# Patient Record
Sex: MALE | Race: WHITE | Employment: FULL TIME | URBAN - METROPOLITAN AREA
[De-identification: names, ages, dates, MRNs, and addresses within clinical notes are randomized per-mention and may not be internally consistent; named-entity substitution may affect disease eponyms.]

---

## 2019-12-11 ENCOUNTER — OFFICE VISIT (OUTPATIENT)
Dept: FAMILY MEDICINE | Facility: CLINIC | Age: 64
End: 2019-12-11
Payer: COMMERCIAL

## 2019-12-11 VITALS
HEIGHT: 65 IN | RESPIRATION RATE: 18 BRPM | BODY MASS INDEX: 35.49 KG/M2 | DIASTOLIC BLOOD PRESSURE: 86 MMHG | TEMPERATURE: 98.1 F | WEIGHT: 213 LBS | OXYGEN SATURATION: 97 % | HEART RATE: 88 BPM | SYSTOLIC BLOOD PRESSURE: 158 MMHG

## 2019-12-11 DIAGNOSIS — M79.641 PAIN OF RIGHT HAND: ICD-10-CM

## 2019-12-11 DIAGNOSIS — G47.19 EXCESSIVE DAYTIME SLEEPINESS: ICD-10-CM

## 2019-12-11 DIAGNOSIS — Z12.12 SCREENING FOR COLORECTAL CANCER: ICD-10-CM

## 2019-12-11 DIAGNOSIS — Z12.11 SCREENING FOR COLORECTAL CANCER: ICD-10-CM

## 2019-12-11 DIAGNOSIS — F17.200 TOBACCO DEPENDENCE SYNDROME: ICD-10-CM

## 2019-12-11 DIAGNOSIS — H90.3 SENSORINEURAL HEARING LOSS (SNHL) OF BOTH EARS: ICD-10-CM

## 2019-12-11 DIAGNOSIS — R03.0 ELEVATED BLOOD PRESSURE READING WITHOUT DIAGNOSIS OF HYPERTENSION: ICD-10-CM

## 2019-12-11 DIAGNOSIS — R06.2 WHEEZING: ICD-10-CM

## 2019-12-11 DIAGNOSIS — Z00.00 ROUTINE GENERAL MEDICAL EXAMINATION AT A HEALTH CARE FACILITY: Primary | ICD-10-CM

## 2019-12-11 DIAGNOSIS — R25.2 CALF CRAMP: ICD-10-CM

## 2019-12-11 LAB
BASOPHILS # BLD AUTO: 0.1 10E9/L (ref 0–0.2)
BASOPHILS NFR BLD AUTO: 0.6 %
DIFFERENTIAL METHOD BLD: NORMAL
EOSINOPHIL # BLD AUTO: 0.4 10E9/L (ref 0–0.7)
EOSINOPHIL NFR BLD AUTO: 3.7 %
ERYTHROCYTE [DISTWIDTH] IN BLOOD BY AUTOMATED COUNT: 14.1 % (ref 10–15)
HBA1C MFR BLD: 6.1 % (ref 0–5.6)
HCT VFR BLD AUTO: 48.3 % (ref 40–53)
HCV AB SERPL QL IA: NONREACTIVE
HGB BLD-MCNC: 15.7 G/DL (ref 13.3–17.7)
HIV 1+2 AB+HIV1 P24 AG SERPL QL IA: NONREACTIVE
LYMPHOCYTES # BLD AUTO: 2.4 10E9/L (ref 0.8–5.3)
LYMPHOCYTES NFR BLD AUTO: 22.8 %
MCH RBC QN AUTO: 29.8 PG (ref 26.5–33)
MCHC RBC AUTO-ENTMCNC: 32.5 G/DL (ref 31.5–36.5)
MCV RBC AUTO: 92 FL (ref 78–100)
MONOCYTES # BLD AUTO: 0.9 10E9/L (ref 0–1.3)
MONOCYTES NFR BLD AUTO: 8.7 %
NEUTROPHILS # BLD AUTO: 6.6 10E9/L (ref 1.6–8.3)
NEUTROPHILS NFR BLD AUTO: 64.2 %
PLATELET # BLD AUTO: 223 10E9/L (ref 150–450)
RBC # BLD AUTO: 5.27 10E12/L (ref 4.4–5.9)
WBC # BLD AUTO: 10.3 10E9/L (ref 4–11)

## 2019-12-11 PROCEDURE — 90715 TDAP VACCINE 7 YRS/> IM: CPT | Performed by: PHYSICIAN ASSISTANT

## 2019-12-11 PROCEDURE — 99213 OFFICE O/P EST LOW 20 MIN: CPT | Mod: 25 | Performed by: PHYSICIAN ASSISTANT

## 2019-12-11 PROCEDURE — 83036 HEMOGLOBIN GLYCOSYLATED A1C: CPT | Performed by: PHYSICIAN ASSISTANT

## 2019-12-11 PROCEDURE — 86803 HEPATITIS C AB TEST: CPT | Performed by: PHYSICIAN ASSISTANT

## 2019-12-11 PROCEDURE — G0103 PSA SCREENING: HCPCS | Performed by: PHYSICIAN ASSISTANT

## 2019-12-11 PROCEDURE — 85025 COMPLETE CBC W/AUTO DIFF WBC: CPT | Performed by: PHYSICIAN ASSISTANT

## 2019-12-11 PROCEDURE — 87389 HIV-1 AG W/HIV-1&-2 AB AG IA: CPT | Performed by: PHYSICIAN ASSISTANT

## 2019-12-11 PROCEDURE — 80053 COMPREHEN METABOLIC PANEL: CPT | Performed by: PHYSICIAN ASSISTANT

## 2019-12-11 PROCEDURE — 90471 IMMUNIZATION ADMIN: CPT | Performed by: PHYSICIAN ASSISTANT

## 2019-12-11 PROCEDURE — 80061 LIPID PANEL: CPT | Performed by: PHYSICIAN ASSISTANT

## 2019-12-11 PROCEDURE — 99386 PREV VISIT NEW AGE 40-64: CPT | Mod: 25 | Performed by: PHYSICIAN ASSISTANT

## 2019-12-11 PROCEDURE — 36415 COLL VENOUS BLD VENIPUNCTURE: CPT | Performed by: PHYSICIAN ASSISTANT

## 2019-12-11 RX ORDER — ALBUTEROL SULFATE 90 UG/1
2 AEROSOL, METERED RESPIRATORY (INHALATION) EVERY 6 HOURS
Qty: 1 INHALER | Refills: 3 | Status: SHIPPED | OUTPATIENT
Start: 2019-12-11 | End: 2020-07-15

## 2019-12-11 ASSESSMENT — ENCOUNTER SYMPTOMS
DIARRHEA: 0
NERVOUS/ANXIOUS: 0
DIZZINESS: 0
ABDOMINAL PAIN: 0
CHILLS: 0
HEMATURIA: 0
EYE PAIN: 0
COUGH: 0
CONSTIPATION: 0
FEVER: 0
FREQUENCY: 0
HEMATOCHEZIA: 0

## 2019-12-11 ASSESSMENT — MIFFLIN-ST. JEOR: SCORE: 1702

## 2019-12-11 NOTE — PROGRESS NOTES
SUBJECTIVE:   CC: Nikunj Keen is an 63 year old male who presents for preventative health visit.     Healthy Habits:     Getting at least 3 servings of Calcium per day:  NO    Bi-annual eye exam:  Yes    Dental care twice a year:  Yes    Sleep apnea or symptoms of sleep apnea:  Daytime drowsiness and Excessive snoring    Diet:  Regular (no restrictions)    Frequency of exercise:  None    Taking medications regularly:  Not Applicable    Medication side effects:  Not applicable    PHQ-2 Total Score: 0    Additional concerns today:  No    63 year old male presenting for routine health physical. He reports he has not had a primary care visit for approximately 20 years. He feels like he is starting to age and would like routine health assessment. He works as a . He has 3 children, youngest age 27. He has 2 grandchildren and one great grandchild. He has been  twice in the past and currently has been in monogamous relationship with girlfriend for the past 10 years.     His biggest health concern today is sleep. He has been snoring, feeling excessively tired during the day, and occasionally will wake up gasping for air.     His blood pressure is elevated today. No personal history of hypertension. He recently adopted a dog which has been helping with exercise. Previously he was sedentary. Has been getting approximately 8,000 steps per day since adopting dog.     He is a current 1 PPD smoker. 46 year pack history. Not interested in quitting at present but he has been cutting back. He would like to focus on weight loss before focusing energy on smoking cessation.     He has a remote history of mild asthma. Has used albuterol inhaler PRN in the past. Notes he occasionally has a wheeze and would like refill of this medication today. No shortness of breath or chest pain. No fevers, chills, cough, night sweats, or unexplained weight loss.     [facility]  [facility]inn[facility]. He would like to be seen by ENT for further evaluation of this concern. He worked for a number of years in the  listening to intelligence feeds and thinks this is to blame for hearing loss. He wears hearing aids which help.     He also notes occasionally his right calf will cramp and he notices some pain near the thumb of his right hand along the first MTP joint. No history of trauma. No history of DVT. No recent long distance travel.     Today's PHQ-2 Score:   PHQ-2 ( 1999 Pfizer) 12/11/2019   Q1: Little interest or pleasure in doing things 0   Q2: Feeling down, depressed or hopeless 0   PHQ-2 Score 0   Q1: Little interest or pleasure in doing things Not at all   Q2: Feeling down, depressed or hopeless Not at all   PHQ-2 Score 0     Abuse: Current or Past(Physical, Sexual or Emotional)- No  Do you feel safe in your environment? Yes    Have you ever done Advance Care Planning? (For example, a Health Directive, POLST, or a discussion with a medical provider or your loved ones about your wishes): No, advance care planning information given to patient to review.  Patient declined advance care planning discussion at this time.    Social History     Tobacco Use     Smoking status: Current Every Day Smoker     Smokeless tobacco: Never Used   Substance Use Topics     Alcohol use: Not Currently     Alcohol Use 12/11/2019   Prescreen: >3 drinks/day or >7 drinks/week? No     Last PSA: No results found for: PSA    Reviewed orders with patient. Reviewed health maintenance and updated orders accordingly - Yes  Lab work is in process    Reviewed and updated as needed this visit by clinical staff  Tobacco  Allergies  Meds  Med Hx  Surg Hx  Fam Hx  Soc Hx        Reviewed and updated as needed this visit by Provider          Review of Systems   Constitutional: Negative for chills and fever.   HENT: Negative for congestion and ear pain.    Eyes: Negative for pain.   Respiratory: Negative for cough.   "  Cardiovascular: Negative for chest pain.   Gastrointestinal: Negative for abdominal pain, constipation, diarrhea and hematochezia.   Genitourinary: Negative for frequency and hematuria.   Neurological: Negative for dizziness.   Psychiatric/Behavioral: The patient is not nervous/anxious.      OBJECTIVE:   BP (!) 158/86 (BP Location: Right arm, Patient Position: Sitting, Cuff Size: Adult Large)   Pulse 88   Temp 98.1  F (36.7  C) (Oral)   Resp 18   Ht 1.657 m (5' 5.25\")   Wt 96.6 kg (213 lb)   SpO2 97%   BMI 35.17 kg/m      Physical Exam  GENERAL: healthy, alert and no distress  EYES: Eyes grossly normal to inspection, PERRL and conjunctivae and sclerae normal  HENT: ear canals and TM's normal, nose and mouth without ulcers or lesions  NECK: no adenopathy, no asymmetry, masses, or scars and thyroid normal to palpation  RESP: lungs clear to auscultation - no rales, rhonchi or wheezes  CV: regular rate and rhythm, normal S1 S2, no S3 or S4, no murmur, click or rub, no peripheral edema and peripheral pulses strong  ABDOMEN: soft, nontender, no hepatosplenomegaly, no masses and bowel sounds normal  MS: no gross musculoskeletal defects noted, no edema  SKIN: no suspicious lesions or rashes  NEURO: Normal strength and tone, mentation intact and speech normal  PSYCH: mentation appears normal, affect normal/bright    Diagnostic Test Results:  Labs reviewed in Epic  Results for orders placed or performed in visit on 12/11/19 (from the past 24 hour(s))   CBC with platelets and differential   Result Value Ref Range    WBC 10.3 4.0 - 11.0 10e9/L    RBC Count 5.27 4.4 - 5.9 10e12/L    Hemoglobin 15.7 13.3 - 17.7 g/dL    Hematocrit 48.3 40.0 - 53.0 %    MCV 92 78 - 100 fl    MCH 29.8 26.5 - 33.0 pg    MCHC 32.5 31.5 - 36.5 g/dL    RDW 14.1 10.0 - 15.0 %    Platelet Count 223 150 - 450 10e9/L    % Neutrophils 64.2 %    % Lymphocytes 22.8 %    % Monocytes 8.7 %    % Eosinophils 3.7 %    % Basophils 0.6 %    Absolute " Neutrophil 6.6 1.6 - 8.3 10e9/L    Absolute Lymphocytes 2.4 0.8 - 5.3 10e9/L    Absolute Monocytes 0.9 0.0 - 1.3 10e9/L    Absolute Eosinophils 0.4 0.0 - 0.7 10e9/L    Absolute Basophils 0.1 0.0 - 0.2 10e9/L    Diff Method Automated Method    Hemoglobin A1c   Result Value Ref Range    Hemoglobin A1C 6.1 (H) 0 - 5.6 %       ASSESSMENT/PLAN:   1. Routine general medical examination at a health care facility  - Hepatitis C Screen Reflex to HCV RNA Quant and Genotype  - HIV Screening  - Lipid panel reflex to direct LDL Fasting  - TDAP VACCINE (ADACEL)  -      ADMIN VACCINE, FIRST  - PSA, screen  - Comprehensive metabolic panel (BMP + Alb, Alk Phos, ALT, AST, Total. Bili, TP)  - CBC with platelets and differential  - Hemoglobin A1c    2. Excessive daytime sleepiness  - Symptoms patient describes sounds like BAKARI, will refer to sleep medicine for further evaluation  - SLEEP EVALUATION & MANAGEMENT REFERRAL - ADULT -[facility] [phone]  (Age 18 and up); Future    3. Sensorineural hearing loss (SNHL) of both ears  - OTOLARYNGOLOGY REFERRAL    4. Screening for colorectal cancer  - GASTROENTEROLOGY ADULT REF PROCEDURE ONLY [facility] [phone]; No Provider Preference    5. Wheezing  - XR Chest 2 Views; Future - unable to get performed in clinic today as xray tech is out   - albuterol (PROAIR HFA/PROVENTIL HFA/VENTOLIN HFA) 108 (90 Base) MCG/ACT inhaler; Inhale 2 puffs into the lungs every 6 hours  Dispense: 1 Inhaler; Refill: 3    6. Tobacco dependence syndrome  - unable to obtain chest xray in clinic today as xray tech is out  - XR Chest 2 Views; Future    7. Elevated blood pressure reading without diagnosis of hypertension  - Return in early 2020 for further evaluation  - Increase activity level  - Follow up with sleep medicine    8. Calf cramp  - Normal on exam  - Encouraged patient to stretch before and after exercise    9. Pain of right hand  - Normal exam  - APAP as needed for  "pain   - Return to care if not improving or worsening       COUNSELING:   Reviewed preventive health counseling, as reflected in patient instructions       Regular exercise       Immunizations    Vaccinated for: TDAP             Colon cancer screening       Prostate cancer screening       Consider lung cancer screening for ages 55-80 years and 30 pack-year smoking history      Estimated body mass index is 35.17 kg/m  as calculated from the following:    Height as of this encounter: 1.657 m (5' 5.25\").    Weight as of this encounter: 96.6 kg (213 lb).     Weight management plan: Discussed healthy diet and exercise guidelines     reports that he has been smoking. He has never used smokeless tobacco.  Tobacco Cessation Action Plan: Information offered: Patient not interested at this time    Counseling Resources:  ATP IV Guidelines  Pooled Cohorts Equation Calculator  FRAX Risk Assessment  ICSI Preventive Guidelines  Dietary Guidelines for Americans, 2010  USDA's MyPlate  ASA Prophylaxis  Lung CA Screening    Francisco Talley PA-C  [facility]  "

## 2019-12-11 NOTE — NURSING NOTE
Clinic Administered Medication Documentation    MEDICATION LIST:   Injectable Medication Documentation    Patient was given TDAP. Prior to medication administration, verified patients identity using patient s name and date of birth. Please see MAR and medication order for additional information. Patient instructed to remain in clinic for 15 minutes and report any adverse reaction to staff immediately .      Was entire vial of medication used? Yes  Vial/Syringe: Single dose vial  Expiration Date:  7/4/2021  Was this medication supplied by the patient? No     Prior to immunization administration, verified patients identity using patient s name and date of birth. Please see Immunization Activity for additional information.     Screening Questionnaire for Adult Immunization    Are you sick today?   Yes   Do you have allergies to medications, food, a vaccine component or latex?   No   Have you ever had a serious reaction after receiving a vaccination?   No   Do you have a long-term health problem with heart disease, lung disease, asthma, kidney disease, metabolic disease (e.g. diabetes), anemia, or other blood disorder?   No   Do you have cancer, leukemia, HIV/AIDS, or any other immune system problem?   No   In the past 3 months, have you taken medications that affect  your immune system, such as prednisone, other steroids, or anticancer drugs; drugs for the treatment of rheumatoid arthritis, Crohn s disease, or psoriasis; or have you had radiation treatments?   No   Have you had a seizure, or a brain or other nervous system problem?   No   During the past year, have you received a transfusion of blood or blood     products, or been given immune (gamma) globulin or antiviral drug?   No   For women: Are you pregnant or is there a chance you could become        pregnant during the next month?   No   Have you received any vaccinations in the past 4 weeks?   No     Immunization questionnaire answers were all negative.         Per orders of Francisco Talley, injection of TDAP given by Jayme Gage. Patient instructed to remain in clinic for 15 minutes afterwards, and to report any adverse reaction to me immediately.    Questionnaire completed by patient.    Jayme Gage MA

## 2019-12-12 ENCOUNTER — TELEPHONE (OUTPATIENT)
Dept: FAMILY MEDICINE | Facility: CLINIC | Age: 64
End: 2019-12-12

## 2019-12-12 DIAGNOSIS — E78.5 HYPERLIPIDEMIA LDL GOAL <130: Primary | ICD-10-CM

## 2019-12-12 LAB
ALBUMIN SERPL-MCNC: 3.6 G/DL (ref 3.4–5)
ALP SERPL-CCNC: 114 U/L (ref 40–150)
ALT SERPL W P-5'-P-CCNC: 59 U/L (ref 0–70)
ANION GAP SERPL CALCULATED.3IONS-SCNC: 2 MMOL/L (ref 3–14)
AST SERPL W P-5'-P-CCNC: 29 U/L (ref 0–45)
BILIRUB SERPL-MCNC: 0.3 MG/DL (ref 0.2–1.3)
BUN SERPL-MCNC: 15 MG/DL (ref 7–30)
CALCIUM SERPL-MCNC: 8.8 MG/DL (ref 8.5–10.1)
CHLORIDE SERPL-SCNC: 107 MMOL/L (ref 94–109)
CHOLEST SERPL-MCNC: 268 MG/DL
CO2 SERPL-SCNC: 26 MMOL/L (ref 20–32)
CREAT SERPL-MCNC: 0.8 MG/DL (ref 0.66–1.25)
GFR SERPL CREATININE-BSD FRML MDRD: >90 ML/MIN/{1.73_M2}
GLUCOSE SERPL-MCNC: 108 MG/DL (ref 70–99)
HDLC SERPL-MCNC: 39 MG/DL
LDLC SERPL CALC-MCNC: 190 MG/DL
NONHDLC SERPL-MCNC: 229 MG/DL
POTASSIUM SERPL-SCNC: 4.2 MMOL/L (ref 3.4–5.3)
PROT SERPL-MCNC: 7.5 G/DL (ref 6.8–8.8)
PSA SERPL-ACNC: 0.92 UG/L (ref 0–4)
SODIUM SERPL-SCNC: 135 MMOL/L (ref 133–144)
TRIGL SERPL-MCNC: 196 MG/DL

## 2019-12-12 RX ORDER — ATORVASTATIN CALCIUM 80 MG/1
80 TABLET, FILM COATED ORAL DAILY
Qty: 90 TABLET | Refills: 0 | Status: SHIPPED | OUTPATIENT
Start: 2019-12-12 | End: 2020-03-06

## 2019-12-12 NOTE — TELEPHONE ENCOUNTER
Called patient with lab results.   The 10-year ASCVD risk score (Lelandalisha MCGUIRE Jr., et al., 2013) is: 30.4%    Values used to calculate the score:      Age: 64 years      Sex: Male      Is Non- : No      Diabetic: No      Tobacco smoker: Yes      Systolic Blood Pressure: 158 mmHg      Is BP treated: No      HDL Cholesterol: 39 mg/dL      Total Cholesterol: 268 mg/dL  Cholesterol is elevated, per guidelines will start high intensity statin. Patient was agreeable with this plan. Will plan to recheck in approximately 3 months. Also will follow up on blood pressure at that time.   Francisco Talley PA-C

## 2020-02-14 ENCOUNTER — OFFICE VISIT (OUTPATIENT)
Dept: SLEEP MEDICINE | Facility: CLINIC | Age: 65
End: 2020-02-14
Attending: PHYSICIAN ASSISTANT
Payer: COMMERCIAL

## 2020-02-14 VITALS
HEIGHT: 65 IN | BODY MASS INDEX: 35.72 KG/M2 | WEIGHT: 214.4 LBS | DIASTOLIC BLOOD PRESSURE: 85 MMHG | HEART RATE: 82 BPM | OXYGEN SATURATION: 97 % | SYSTOLIC BLOOD PRESSURE: 151 MMHG | RESPIRATION RATE: 18 BRPM

## 2020-02-14 DIAGNOSIS — R29.818 SUSPECTED SLEEP APNEA: Primary | ICD-10-CM

## 2020-02-14 DIAGNOSIS — G47.19 EXCESSIVE DAYTIME SLEEPINESS: ICD-10-CM

## 2020-02-14 DIAGNOSIS — R06.83 SNORING: ICD-10-CM

## 2020-02-14 PROCEDURE — 99204 OFFICE O/P NEW MOD 45 MIN: CPT | Performed by: INTERNAL MEDICINE

## 2020-02-14 ASSESSMENT — MIFFLIN-ST. JEOR: SCORE: 1699.39

## 2020-02-14 NOTE — PROGRESS NOTES
Sleep Consultation:    Date on this visit: 2/14/2020    Nikunj Keen is sent by Francisco Talley for a sleep consultation regarding sleepiness.    Primary Physician: Francisco Talley     Chief complaint: non-restorative sleep, excessive daytime sleepiness     Presenting History:     Nikunj Keen reports nightly poor quality of sleep for last 2 years.     Medical history is significant for sensorineural hearing loss, hypertension and hyperlipidemia.     Patient complains of not feeling rested in the morning and feeling sleepy during the day. He also have difficulty staying asleep and has 3-4 arousals during the night when he wakes up for uncertain reasons and takes 15 minutes to return to sleep.     Nikunj does snore every night. Patient does have a regular bed partner. There is report of snoring and gasping.  He does not have witnessed apneas. They frequently sleep separately.  Patient sleeps on his back, side and stomach. He has frequent morning dry mouth, denies no morning headaches and restless legs. Nikunj denies any bruxism, sleep walking, sleep talking, dream enactment, sleep paralysis, cataplexy and hypnogogic/hypnopompic hallucinations.    Nikunj goes to sleep at 9:30 PM during the week. He wakes up at 6:00 AM with an alarm. He falls asleep in 30 minutes.  Nikunj denies difficulty falling asleep.  He wakes up 2-4 times a night for 15 - 30 minutes before falling back to sleep.  Nikunj wakes up to go to the bathroom and uncertain reasons.  On weekends, Nikunj goes to sleep at 10:00 PM.  He wakes up at 6:00 AM without an alarm. He falls asleep in 30 minutes.  Patient gets an average of 6 hours of sleep per night.     Nikunj denies difficulty breathing through his nose.      Patient's Saline Sleepiness score 13/24 consistent with mild daytime sleepiness.      Nikunj naps 1-2 times per week for 30 minutes, feels refreshed after naps. He takes some inadvertant naps.  He denies closing eyes, dozing and  falling asleep while driving.  Patient was counseled on the importance of driving while alert, to pull over if drowsy, or nap before getting into the vehicle if sleepy.      He uses 4 cups/day of coffee. Last caffeine intake is usually before 4 pm.    Allergies:    No Known Allergies    Medications:    Current Outpatient Medications   Medication Sig Dispense Refill     albuterol (PROAIR HFA/PROVENTIL HFA/VENTOLIN HFA) 108 (90 Base) MCG/ACT inhaler Inhale 2 puffs into the lungs every 6 hours 1 Inhaler 3     atorvastatin (LIPITOR) 80 MG tablet Take 1 tablet (80 mg) by mouth daily 90 tablet 0       Problem List:  There are no active problems to display for this patient.       Past Medical/Surgical History:    1. Elevated blood pressure   2. Hyperlipidemia     Social History:  Social History     Socioeconomic History     Marital status:      Spouse name: Not on file     Number of children: Not on file     Years of education: Not on file     Highest education level: Not on file   Occupational History     Not on file   Social Needs     Financial resource strain: Not on file     Food insecurity:     Worry: Not on file     Inability: Not on file     Transportation needs:     Medical: Not on file     Non-medical: Not on file   Tobacco Use     Smoking status: Current Every Day Smoker     Smokeless tobacco: Never Used   Substance and Sexual Activity     Alcohol use: Not Currently     Drug use: Never     Sexual activity: Yes     Partners: Female   Lifestyle     Physical activity:     Days per week: Not on file     Minutes per session: Not on file     Stress: Not on file   Relationships     Social connections:     Talks on phone: Not on file     Gets together: Not on file     Attends [employer] service: Not on file     Active member of club or organization: Not on file     Attends meetings of clubs or organizations: Not on file     Relationship status: Not on file     Intimate partner violence:     Fear of current or ex  "partner: Not on file     Emotionally abused: Not on file     Physically abused: Not on file     Forced sexual activity: Not on file   Other Topics Concern     Not on file   Social History Narrative     Not on file       Family History:    - No family history of sleep disorders.     Review of Systems:  A complete review of systems reviewed by me is negative with the exeption of what has been mentioned in the history of present illness.  CONSTITUTIONAL: NEGATIVE for weight gain/loss, fever, chills, sweats or night sweats, drug allergies.  EYES: NEGATIVE for changes in vision, blind spots, double vision.  ENT: NEGATIVE for ear pain, sore throat, sinus pain, post-nasal drip, runny nose, bloody nose  CARDIAC: NEGATIVE for fast heartbeats or fluttering in chest, chest pain or pressure, breathlessness when lying flat, swollen legs or swollen feet.  NEUROLOGIC: NEGATIVE headaches, weakness or numbness in the arms or legs.  DERMATOLOGIC: NEGATIVE for rashes, new moles or change in mole(s)  PULMONARY:  POSITIVE for  SOB with activity  GASTROINTESTINAL: NEGATIVE for nausea or vomitting, loose or watery stools, fat or grease in stools, constipation, abdominal pain, bowel movements black in color or blood noted.  GENITOURINARY: NEGATIVE for pain during urination, blood in urine, urinating more frequently than usual, irregular menstrual periods.  MUSCULOSKELETAL: NEGATIVE for muscle pain, bone or joint pain, swollen joints.  ENDOCRINE: NEGATIVE for increased thirst or urination, diabetes.  LYMPHATIC: NEGATIVE for swollen lymph nodes, lumps or bumps in the breasts or nipple discharge.    Physical Examination:  Vitals: BP (!) 151/85   Pulse 82   Resp 18   Ht 1.651 m (5' 5\")   Wt 97.3 kg (214 lb 6.4 oz)   SpO2 97%   BMI 35.68 kg/m    BMI= Body mass index is 35.68 kg/m .    Neck Cir (cm): 46 cm    [place] Total Score 2/14/2020   Total score - [place] 13       YAYA Total Score: 18 (02/14/20 1218)    GENERAL APPEARANCE: healthy, " alert and no distress  EYES: Eyes grossly normal to inspection, PERRL and conjunctivae and sclerae normal  HENT: nose and mouth without ulcers or lesions, oropharynx crowded, uvula elongated, soft palate dependent, tongue base enlarged and eduntulous   NECK: no adenopathy, no asymmetry, masses, or scars and thyroid normal to palpation  RESP: lungs clear to auscultation - no rales, rhonchi or wheezes  CV: regular rates and rhythm, normal S1 S2, no S3 or S4 and no murmur, click or rub  ABDOMEN: soft, nontender, without hepatosplenomegaly or masses and bowel sounds normal  MS: extremities normal- no gross deformities noted  NEURO: Normal strength and tone, mentation intact and speech normal  PSYCH: mentation appears normal and affect normal/bright  Mallampati Class: IV.  Tonsillar Stage: 1  hidden by pillars.    Impression/Plan:    1. Probable Obstructive sleep apnea  2. Hypersomnia     - Patient is a 64 years old male, BMI 36, neck circumference 46 cm, presenting with a history of snoring, non restorative sleep and daytime sleepiness. There is a high risk for sleep apnea and an overnight sleep study is recommended for evaluation.     Plan:     1. Home sleep apnea testing     He will follow up with me in approximately two weeks after his sleep study has been competed to review the results and discuss plan of care.       Polysomnography & HST reviewed.  Limitations of HST reviewed.   Obstructive sleep apnea reviewed.  Complications of untreated sleep apnea were reviewed.    Dr. Abdi Newton     CC: Francisco Talley

## 2020-02-14 NOTE — PATIENT INSTRUCTIONS
Your BMI is Body mass index is 35.68 kg/m .  Weight management is a personal decision.  If you are interested in exploring weight loss strategies, the following discussion covers the approaches that may be successful. Body mass index (BMI) is one way to tell whether you are at a healthy weight, overweight, or obese. It measures your weight in relation to your height.  A BMI of 18.5 to 24.9 is in the healthy range. A person with a BMI of 25 to 29.9 is considered overweight, and someone with a BMI of 30 or greater is considered obese. More than two-thirds of American adults are considered overweight or obese.  Being overweight or obese increases the risk for further weight gain. Excess weight may lead to heart disease and diabetes.  Creating and following plans for healthy eating and physical activity may help you improve your health.  Weight control is part of healthy lifestyle and includes exercise, emotional health, and healthy eating habits. Careful eating habits lifelong are the mainstay of weight control. Though there are significant health benefits from weight loss, long-term weight loss with diet alone may be very difficult to achieve- studies show long-term success with dietary management in less than 10% of people. Attaining a healthy weight may be especially difficult to achieve in those with severe obesity. In some cases, medications, devices and surgical management might be considered.  What can you do?  If you are overweight or obese and are interested in methods for weight loss, you should discuss this with your provider.     Consider reducing daily calorie intake by 500 calories.     Keep a food journal.     Avoiding skipping meals, consider cutting portions instead.    Diet combined with exercise helps maintain muscle while optimizing fat loss. Strength training is particularly important for building and maintaining muscle mass. Exercise helps reduce stress, increase energy, and improves fitness.  Increasing exercise without diet control, however, may not burn enough calories to loose weight.       Start walking three days a week 10-20 minutes at a time    Work towards walking thirty minutes five days a week     Eventually, increase the speed of your walking for 1-2 minutes at time    In addition, we recommend that you review healthy lifestyles and methods for weight loss available through the National Institutes of Health patient information sites:  http://win.niddk.nih.gov/publications/index.htm    And look into health and wellness programs that may be available through your health insurance provider, employer, local community center, or [employer]ri[employer] club.    Weight management plan: Patient was referred to their PCP to discuss a diet and exercise plan.

## 2020-02-14 NOTE — PROGRESS NOTES
"Chief Complaint   Patient presents with     Sleep Problem       Initial BP (!) 151/85   Pulse 82   Resp 18   Ht 1.651 m (5' 5\")   Wt 97.3 kg (214 lb 6.4 oz)   SpO2 97%   BMI 35.68 kg/m   Estimated body mass index is 35.68 kg/m  as calculated from the following:    Height as of this encounter: 1.651 m (5' 5\").    Weight as of this encounter: 97.3 kg (214 lb 6.4 oz).    Medication Reconciliation: complete    Neck circumference: 18 inches / 46 centimeters.    ESS: 13    Laura Dayton  [facility] - Specialist            "

## 2020-03-12 ENCOUNTER — MYC REFILL (OUTPATIENT)
Dept: FAMILY MEDICINE | Facility: CLINIC | Age: 65
End: 2020-03-12

## 2020-03-12 DIAGNOSIS — E78.5 HYPERLIPIDEMIA LDL GOAL <130: ICD-10-CM

## 2020-03-12 RX ORDER — ATORVASTATIN CALCIUM 80 MG/1
80 TABLET, FILM COATED ORAL DAILY
Qty: 90 TABLET | Refills: 0 | Status: CANCELLED | OUTPATIENT
Start: 2020-03-12

## 2020-03-17 ENCOUNTER — ANCILLARY PROCEDURE (OUTPATIENT)
Dept: GENERAL RADIOLOGY | Facility: CLINIC | Age: 65
End: 2020-03-17
Attending: PHYSICIAN ASSISTANT
Payer: COMMERCIAL

## 2020-03-17 DIAGNOSIS — R06.2 WHEEZING: ICD-10-CM

## 2020-03-17 DIAGNOSIS — F17.200 TOBACCO DEPENDENCE SYNDROME: ICD-10-CM

## 2020-03-17 PROCEDURE — 71046 X-RAY EXAM CHEST 2 VIEWS: CPT

## 2020-05-12 ENCOUNTER — TELEPHONE (OUTPATIENT)
Dept: SLEEP MEDICINE | Facility: CLINIC | Age: 65
End: 2020-05-12

## 2020-05-12 NOTE — TELEPHONE ENCOUNTER
LVM for return call to see if interested in completing HST through [facility].

## 2020-05-18 ENCOUNTER — OFFICE VISIT (OUTPATIENT)
Dept: SLEEP MEDICINE | Facility: CLINIC | Age: 65
End: 2020-05-18
Payer: COMMERCIAL

## 2020-05-18 DIAGNOSIS — G47.19 EXCESSIVE DAYTIME SLEEPINESS: ICD-10-CM

## 2020-05-18 DIAGNOSIS — R29.818 SUSPECTED SLEEP APNEA: ICD-10-CM

## 2020-05-18 DIAGNOSIS — G47.33 OSA (OBSTRUCTIVE SLEEP APNEA): ICD-10-CM

## 2020-05-18 DIAGNOSIS — R06.83 SNORING: ICD-10-CM

## 2020-05-18 PROCEDURE — G0399 HOME SLEEP TEST/TYPE 3 PORTA: HCPCS

## 2020-05-19 ENCOUNTER — DOCUMENTATION ONLY (OUTPATIENT)
Dept: SLEEP MEDICINE | Facility: CLINIC | Age: 65
End: 2020-05-19
Payer: COMMERCIAL

## 2020-05-19 NOTE — PROGRESS NOTES
This HSAT was performed using a Noxturnal T3 device which recorded snore, sound, movement activity, body position, nasal pressure, oronasal thermal airflow, pulse, oximetry and both chest and abdominal respiratory effort. HSAT data was restricted to the time patient states they were in bed.     Airflow and pulse oximeter signals were missing for about an hour each. AHI would've been higher if signal was not lost.    HSAT was scored using 1B 4% hypopnea rule.     HST AHI (Non-PAT): 68.8  Snoring was reported as loud.  Time with SpO2 below 89% was 46 minutes.   Overall signal quality was fair     Pt will follow up with sleep provider to determine appropriate therapy.

## 2020-05-21 PROBLEM — G47.33 OSA (OBSTRUCTIVE SLEEP APNEA): Status: ACTIVE | Noted: 2020-05-21

## 2020-05-21 NOTE — PROCEDURES
"HOME SLEEP STUDY INTERPRETATION    Patient: Nikunj Keen  MRN: [id number]  YOB: 1955  Study Date: 2020  Referring Provider: Francisco Talley;   Ordering Provider: Abdi Newton MD, MD     Indications for Home Study: Nikunj Keen is a 64 year old male with a history of HTN who presents with symptoms suggestive of obstructive sleep apnea.    Estimated body mass index is 35.68 kg/m  as calculated from the following:    Height as of 20: 1.651 m (5' 5\").    Weight as of 20: 97.3 kg (214 lb 6.4 oz).  Total score - [place]: 13 (2020 12:18 PM)  STOP-BAN/8    Data: A full night home sleep study was performed recording the standard physiologic parameters including body position, movement, sound, nasal pressure, thermal oral airflow, chest and abdominal movements with respiratory inductance plethysmography, and oxygen saturation by pulse oximetry. Pulse rate was estimated by oximetry recording. This study was considered adequate based on > 4 hours of quality oximetry and respiratory recording. As specified by the AASM Manual for the Scoring of Sleep and Associated events, version 2.3, Rule VIII.D 1B, 4% oxygen desaturation scoring for hypopneas is used as a standard of care on all home sleep apnea testing.    Analysis Time:  362 minutes    Respiration:   Sleep Associated Hypoxemia: sustained hypoxemia was present. Baseline oxygen saturation was 91.5%.  Time with saturation less than or equal to 88% was 46 minutes. The lowest oxygen saturation was 81%.   Snoring: Snoring was present.  Respiratory events: The home study revealed a presence of 351 obstructive apneas and 0 mixed and central apneas. There were 64 hypopneas resulting in a combined apnea/hypopnea index [AHI] of 68.8 events per hour.  AHI was 104.7 per hour supine, 5.4 per hour prone, 58.6 per hour on left side, and 0 per hour on right side.   Pattern: Excluding events noted above, respiratory rate and pattern was " Normal.    Position: Percent of time spent: supine - 45%, prone - 21.4%, on left - 32.7%, on right - 0.8%.    Heart Rate: By pulse oximetry normal rate was noted.     Assessment:   Severe obstructive sleep apnea.  Sleep associated hypoxemia was present.    Recommendations:    CPAP therapy is the treatment of choice for severe sleep apnea. Consider auto-CPAP at 7-15 cmH2O.  Suggest optimizing sleep hygiene and avoiding sleep deprivation.  Weight management.    Diagnosis Code(s): Obstructive Sleep Apnea G47.33, Hypoxemia G47.36    Abdi Newton MD, MD, May 21, 2020   Diplomate, American Board of Psychiatry and Neurology, Sleep Medicine

## 2020-05-28 VITALS — BODY MASS INDEX: 32.95 KG/M2 | WEIGHT: 205 LBS | HEIGHT: 66 IN

## 2020-05-28 ASSESSMENT — MIFFLIN-ST. JEOR: SCORE: 1672.62

## 2020-05-28 NOTE — PROGRESS NOTES
"Nikunj Keen is a 64 year old male who is being evaluated via a billable video visit.      The patient has been notified of following:     \"This video visit will be conducted via a call between you and your physician/provider. We have found that certain health care needs can be provided without the need for an in-person physical exam.  This service lets us provide the care you need with a video conversation.  If a prescription is necessary we can send it directly to your pharmacy.  If lab work is needed we can place an order for that and you can then stop by our lab to have the test done at a later time.    Video visits are billed at different rates depending on your insurance coverage.  Please reach out to your insurance provider with any questions.    If during the course of the call the physician/provider feels a video visit is not appropriate, you will not be charged for this service.\"    Patient has given verbal consent for Video visit? Yes    How would you like to obtain your AVS? MyChart    Patient would like the video invitation sent by: Send to e-mail at: [email or web address]    Will anyone else be joining your video visit? No        Video-Visit Details    Type of service:  Video Visit    Video Start Time: 11:28 AM  Video End Time: 11: 49 AM    Originating Location (pt. Location): Home    Distant Location (provider location):  [facility]     Platform used for Video Visit: Kyra Newton MD      Sleep Study Follow-Up Visit:    Date on this visit: 5/29/2020    Nikunj Keen comes in today for follow-up of his home sleep study done on 5/18/2020 at the [facility] [place] for possible sleep apnea.    Respiratory events: The home study revealed a presence of 351 obstructive apneas and 0 mixed and central apneas. There were 64 hypopneas resulting in a combined apnea/hypopnea index [AHI] of 68.8 events per hour.  AHI was 104.7 per hour supine, 5.4 per hour prone, 58.6 " per hour on left side, and 0 per hour on right side.   Pattern: Excluding events noted above, respiratory rate and pattern was Normal.    Sleep Associated Hypoxemia: sustained hypoxemia was present. Baseline oxygen saturation was 91.5%.  Time with saturation less than or equal to 88% was 46 minutes. The lowest oxygen saturation was 81%.   Snoring: Snoring was present.     Position: Percent of time spent: supine - 45%, prone - 21.4%, on left - 32.7%, on right - 0.8%.     Heart Rate: By pulse oximetry normal rate was noted.      Assessment:   Severe obstructive sleep apnea.  Sleep associated hypoxemia was present.    These findings were reviewed with patient.     Past medical/surgical history, family history, social history, medications and allergies were reviewed.      Problem List:  Patient Active Problem List    Diagnosis Date Noted     BAKARI (obstructive sleep apnea) 05/21/2020     Priority: Medium     Severe BAKARI          Impression/Plan:    1. Severe Obstructive sleep apnea    - Sleep study result was discussed in detail. There is severe sleep apnea with associated oxygen desaturations. CPAP therpay is the treatment of choice which patient willing to start.     Plan:     1. Start auto PAP therapy 5-15 cm H2O    2. Hypertension     - Treatment of sleep apnea can help better blood pressure control.     3. Excessive daytime sleepiness     - Sleepiness should improve with treatment of sleep apnea.     4. Obesity     - Patient was counseled on link between sleep apnea and obesity and benefits of weight management.     He will follow up with me in about 7 week(s).     Twenty one minutes spent with patient, all of which were spent face-to-face counseling, consulting, coordinating plan of care.      Dr. Abdi Newton     CC: Francisco Talley

## 2020-05-28 NOTE — PATIENT INSTRUCTIONS
Your BMI is Body mass index is 33.09 kg/m .  Weight management is a personal decision.  If you are interested in exploring weight loss strategies, the following discussion covers the approaches that may be successful. Body mass index (BMI) is one way to tell whether you are at a healthy weight, overweight, or obese. It measures your weight in relation to your height.  A BMI of 18.5 to 24.9 is in the healthy range. A person with a BMI of 25 to 29.9 is considered overweight, and someone with a BMI of 30 or greater is considered obese. More than two-thirds of American adults are considered overweight or obese.  Being overweight or obese increases the risk for further weight gain. Excess weight may lead to heart disease and diabetes.  Creating and following plans for healthy eating and physical activity may help you improve your health.  Weight control is part of healthy lifestyle and includes exercise, emotional health, and healthy eating habits. Careful eating habits lifelong are the mainstay of weight control. Though there are significant health benefits from weight loss, long-term weight loss with diet alone may be very difficult to achieve- studies show long-term success with dietary management in less than 10% of people. Attaining a healthy weight may be especially difficult to achieve in those with severe obesity. In some cases, medications, devices and surgical management might be considered.  What can you do?  If you are overweight or obese and are interested in methods for weight loss, you should discuss this with your provider.     Consider reducing daily calorie intake by 500 calories.     Keep a food journal.     Avoiding skipping meals, consider cutting portions instead.    Diet combined with exercise helps maintain muscle while optimizing fat loss. Strength training is particularly important for building and maintaining muscle mass. Exercise helps reduce stress, increase energy, and improves fitness.  Increasing exercise without diet control, however, may not burn enough calories to loose weight.       Start walking three days a week 10-20 minutes at a time    Work towards walking thirty minutes five days a week     Eventually, increase the speed of your walking for 1-2 minutes at time    In addition, we recommend that you review healthy lifestyles and methods for weight loss available through the National Institutes of Health patient information sites:  http://win.niddk.nih.gov/publications/index.htm    And look into health and wellness programs that may be available through your health insurance provider, employer, local community center, or [employer].    Weight management plan: Patient was referred to their PCP to discuss a diet and exercise plan.

## 2020-05-29 ENCOUNTER — VIRTUAL VISIT (OUTPATIENT)
Dept: SLEEP MEDICINE | Facility: CLINIC | Age: 65
End: 2020-05-29
Payer: COMMERCIAL

## 2020-05-29 DIAGNOSIS — G47.33 OSA (OBSTRUCTIVE SLEEP APNEA): Primary | ICD-10-CM

## 2020-05-29 PROCEDURE — 99214 OFFICE O/P EST MOD 30 MIN: CPT | Mod: GT | Performed by: INTERNAL MEDICINE

## 2020-06-08 ENCOUNTER — TELEPHONE (OUTPATIENT)
Dept: SLEEP MEDICINE | Facility: CLINIC | Age: 65
End: 2020-06-08

## 2020-06-08 NOTE — TELEPHONE ENCOUNTER
DISCUSSED PAP MACHINE AND MASK OPTIONS WITH PATIENT ALONG WITH PRIC[employer]. BECAUSE HIS DEDUCTIBLE IS HIGH HE IS GOING TO PURCHASE A MACHINE AND SUPPLIES OOP ONLINE. MAILED RX TO PATIENT. LET HIM KNOW HE CAN CALL US IF HE HAS ANY QUESTIONS.

## 2020-06-09 DIAGNOSIS — E78.5 HYPERLIPIDEMIA LDL GOAL <130: ICD-10-CM

## 2020-06-10 RX ORDER — ATORVASTATIN CALCIUM 80 MG/1
80 TABLET, FILM COATED ORAL DAILY
Qty: 90 TABLET | Refills: 0 | Status: SHIPPED | OUTPATIENT
Start: 2020-06-10 | End: 2020-09-15

## 2020-06-10 NOTE — TELEPHONE ENCOUNTER
Medication is being filled for 1 time refill only due to:  Patient needs to be seen because needs recheck.      Return in about 2 months (around 2/11/2020) for BP Recheck, Lab Work, Routine Visit

## 2020-07-14 ENCOUNTER — DOCUMENTATION ONLY (OUTPATIENT)
Dept: SLEEP MEDICINE | Facility: CLINIC | Age: 65
End: 2020-07-14

## 2020-07-14 DIAGNOSIS — G47.33 OSA (OBSTRUCTIVE SLEEP APNEA): ICD-10-CM

## 2020-07-14 NOTE — PROGRESS NOTES
14  DAY STM VISIT    Diagnostic AHI:   68.8  HST    Subjective measures:   Patient is currently using an F30i  He is using an Resmed device.   Pt states things are going well and has no issues or complaints.  Pt is benefiting from therapy.      Assessment: Pt received his device from [employer] and we do not receive data.  Patient meeting subjective benchmarks.     Action plan: 2 week STM recheck appt scheduled and follow up per provider request      Device type: Auto-CPAP         Mask type:  Nasal Mask

## 2020-07-15 ENCOUNTER — OFFICE VISIT (OUTPATIENT)
Dept: FAMILY MEDICINE | Facility: CLINIC | Age: 65
End: 2020-07-15
Payer: COMMERCIAL

## 2020-07-15 VITALS
WEIGHT: 220 LBS | SYSTOLIC BLOOD PRESSURE: 134 MMHG | RESPIRATION RATE: 18 BRPM | DIASTOLIC BLOOD PRESSURE: 78 MMHG | OXYGEN SATURATION: 97 % | HEART RATE: 80 BPM | BODY MASS INDEX: 35.51 KG/M2 | TEMPERATURE: 98.8 F

## 2020-07-15 DIAGNOSIS — F17.200 TOBACCO DEPENDENCE SYNDROME: ICD-10-CM

## 2020-07-15 DIAGNOSIS — E78.5 HYPERLIPIDEMIA LDL GOAL <130: ICD-10-CM

## 2020-07-15 DIAGNOSIS — R73.03 PREDIABETES: ICD-10-CM

## 2020-07-15 DIAGNOSIS — Z23 ENCOUNTER FOR IMMUNIZATION: ICD-10-CM

## 2020-07-15 DIAGNOSIS — R03.0 ELEVATED BLOOD PRESSURE READING WITHOUT DIAGNOSIS OF HYPERTENSION: Primary | ICD-10-CM

## 2020-07-15 DIAGNOSIS — R06.2 WHEEZING: ICD-10-CM

## 2020-07-15 DIAGNOSIS — Z12.11 SCREEN FOR COLON CANCER: ICD-10-CM

## 2020-07-15 DIAGNOSIS — L98.9 SKIN LESION: ICD-10-CM

## 2020-07-15 LAB
CHOLEST SERPL-MCNC: 145 MG/DL
HBA1C MFR BLD: 6.4 % (ref 0–5.6)
HDLC SERPL-MCNC: 34 MG/DL
LDLC SERPL CALC-MCNC: 72 MG/DL
NONHDLC SERPL-MCNC: 111 MG/DL
TRIGL SERPL-MCNC: 195 MG/DL

## 2020-07-15 PROCEDURE — 90732 PPSV23 VACC 2 YRS+ SUBQ/IM: CPT | Performed by: PHYSICIAN ASSISTANT

## 2020-07-15 PROCEDURE — 99214 OFFICE O/P EST MOD 30 MIN: CPT | Mod: 25 | Performed by: PHYSICIAN ASSISTANT

## 2020-07-15 PROCEDURE — 83036 HEMOGLOBIN GLYCOSYLATED A1C: CPT | Performed by: PHYSICIAN ASSISTANT

## 2020-07-15 PROCEDURE — 90471 IMMUNIZATION ADMIN: CPT | Performed by: PHYSICIAN ASSISTANT

## 2020-07-15 PROCEDURE — 36415 COLL VENOUS BLD VENIPUNCTURE: CPT | Performed by: PHYSICIAN ASSISTANT

## 2020-07-15 PROCEDURE — 80061 LIPID PANEL: CPT | Performed by: PHYSICIAN ASSISTANT

## 2020-07-15 RX ORDER — ALBUTEROL SULFATE 90 UG/1
2 AEROSOL, METERED RESPIRATORY (INHALATION) EVERY 6 HOURS
Qty: 1 INHALER | Refills: 4 | Status: SHIPPED | OUTPATIENT
Start: 2020-07-15 | End: 2020-10-06

## 2020-07-15 NOTE — PROGRESS NOTES
"Subjective     Nikunj Keen is a 64 year old male who presents to clinic today for the following health issues:    [facility]   Health Follow Up    Last visit 12/11/2019 patient was new to me at that time. After that visit he was started on a high dose statin for hyperlipidemia and elevated ASCVD risk    He was also referred to sleep medicine for BAKARI evaluation. He was ultimately diagnosed with BAKARI with greater than 60 event nightly. Has been wearing CPAP past 30 days and has noticed it helps a great deal with energy and sleep quality.     He reports he has not been exercising as much recently. Since working from home he is not the one to walk the dog anymore. His partner has been doing this. As a result he is not exercising.     Still smoking. Has increased smoking since working from home. 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 Has tried PO smoking cessation medications in the past but they caused \"altered thoughts\". Would be open to trying something again in the future once work settles down. 47 pack year smoking history.     Some seasonal allergy symptoms. Not taking anything. Scratchy throat occasionally and slight ear ache x 3 weeks.     Still uses albuterol PRN for mild asthma. Uses more in the spring when allergies are worse.     Skin lesion right cheek. Has been changing over time. Red and raised. Would like referral to derm for biopsy.       Review of Systems   Constitutional, HEENT, cardiovascular, pulmonary, gi and gu systems are negative, except as otherwise noted.      Objective    /78 (BP Location: Left arm, Patient Position: Sitting, Cuff Size: Adult Large)   Pulse 80   Temp 98.8  F (37.1  C) (Oral)   Resp 18   Wt 99.8 kg (220 lb)   SpO2 97%   BMI 35.51 kg/m    Body mass index is 35.51 kg/m .  Physical Exam  Vitals signs and nursing note reviewed.   Constitutional:       Appearance: He is obese.   HENT:      Head: Normocephalic and atraumatic.      Right Ear: Tympanic membrane, ear " canal and external ear normal.      Left Ear: Tympanic membrane, ear canal and external ear normal.      Mouth/Throat:      Mouth: Mucous membranes are moist.      Pharynx: No posterior oropharyngeal erythema.   Eyes:      Conjunctiva/sclera: Conjunctivae normal.   Neck:      Musculoskeletal: Neck supple.   Cardiovascular:      Rate and Rhythm: Normal rate and regular rhythm.      Heart sounds: Normal heart sounds.   Pulmonary:      Effort: Pulmonary effort is normal.      Breath sounds: Normal breath sounds.   Skin:     General: Skin is warm and dry.      Findings: Lesion (red raised lesion to right cheek) present.   Neurological:      Mental Status: He is alert and oriented to person, place, and time.   Psychiatric:         Mood and Affect: Mood normal.         Behavior: Behavior normal.            Diagnostic Test Results:  Labs reviewed in Epic  Results for orders placed or performed in visit on 07/15/20   Lipid panel reflex to direct LDL Non-fasting     Status: Abnormal   Result Value Ref Range    Cholesterol 145 <200 mg/dL    Triglycerides 195 (H) <150 mg/dL    HDL Cholesterol 34 (L) >39 mg/dL    LDL Cholesterol Calculated 72 <100 mg/dL    Non HDL Cholesterol 111 <130 mg/dL   Hemoglobin A1c     Status: Abnormal   Result Value Ref Range    Hemoglobin A1C 6.4 (H) 0 - 5.6 %           Assessment & Plan     (R03.0) Elevated blood pressure reading without diagnosis of hypertension  (primary encounter diagnosis)  Comment: Ordered home BP monitor. Patient to monitor and return for follow up appointment in 2-3 months.   Plan: Home Blood Pressure Monitor Order for DME -         ONLY FOR DME          (Z12.11) Screen for colon cancer  Comment: due for colonoscopy. Referral placed.  Plan: GASTROENTEROLOGY ADULT REF PROCEDURE ONLY          (R06.2) Wheezing  Comment: Uses albuterol PRN for asthma symptoms.   Plan: albuterol (PROAIR HFA/PROVENTIL HFA/VENTOLIN         HFA) 108 (90 Base) MCG/ACT inhaler          (E78.5)  Hyperlipidemia LDL goal <130  Comment: Lipid panel significantly improved compared to last visit. Continue statin at 80 mg daily.   Plan: Lipid panel reflex to direct LDL Non-fasting          (R73.03) Prediabetes  Comment: A1c climbing, referral to nutrition placed   Plan: Hemoglobin A1c, NUTRITION REFERRAL          (F17.200) Tobacco dependence syndrome  Comment: Not ready to quit smoking right now. Maybe will be ready when work slows down. CT lung cancer screening ordered.   Plan: CT Chest Lung Cancer Scrn Low Dose wo          (Z23) Encounter for immunization  Comment: PPSV23 given today. No shingrix in clinic. Advised to go to pharmacy for Shingrix. Next dose in 2-6 months.   Plan: PPSV23, IM/SUBQ (2+ YRS) - [id number],         CANCELED: ZOSTER VACCINE RECOMBINANT ADJUVANTED        IM NJX          (L98.9) Skin lesion  Comment: Follow up with dermatology for further evaluation.  Plan: DERMATOLOGY REFERRAL               Patient Instructions     Coenzyme q-10 you can  over the counter -- may help with muscle aches     To schedule you CT scan for lung cancer screening call [phone]    You will be contacted to schedule the colonoscopy     a blood pressure monitor and check your BP twice daily. Please bring results to our follow up visit in 3 months. If you persistently notice BP greater than 140/90 let me know and we may start a low dose blood pressure medication.     Try Flonase, claritin, or zyrtec for seasonal allergy symptoms.       Contact ENT to set up an appointment regarding hearing loss:    Your provider has referred you to: [facility]: [facility] [place] [phone]   [email or web address]              Return in about 3 months (around 10/15/2020) for Lab Work, Routine Visit.    Francisco Talley PA-C  [facility]

## 2020-07-15 NOTE — PATIENT INSTRUCTIONS
Coenzyme q-10 you can  over the counter -- may help with muscle aches     To schedule you CT scan for lung cancer screening call [phone]    You will be contacted to schedule the colonoscopy     a blood pressure monitor and check your BP twice daily. Please bring results to our follow up visit in 3 months. If you persistently notice BP greater than 140/90 let me know and we may start a low dose blood pressure medication.     Try Flonase, claritin, or zyrtec for seasonal allergy symptoms.       Contact ENT to set up an appointment regarding hearing loss:    Your provider has referred you to: [facility]: [facility] [place] [phone]   [email or web address]

## 2020-07-15 NOTE — NURSING NOTE
Prior to immunization administration, verified patients identity using patient s name and date of birth. Please see Immunization Activity for additional information.     Screening Questionnaire for Adult Immunization    Are you sick today?   No   Do you have allergies to medications, food, a vaccine component or latex?   No   Have you ever had a serious reaction after receiving a vaccination?   No   Do you have a long-term health problem with heart, lung, kidney, or metabolic disease (e.g., diabetes), asthma, a blood disorder, no spleen, complement component deficiency, a cochlear implant, or a spinal fluid leak?  Are you on long-term aspirin therapy?   No   Do you have cancer, leukemia, HIV/AIDS, or any other immune system problem?   No   Do you have a parent, brother, or sister with an immune system problem?   No   In the past 3 months, have you taken medications that affect  your immune system, such as prednisone, other steroids, or anticancer drugs; drugs for the treatment of rheumatoid arthritis, Crohn s disease, or psoriasis; or have you had radiation treatments?   No   Have you had a seizure, or a brain or other nervous system problem?   No   During the past year, have you received a transfusion of blood or blood    products, or been given immune (gamma) globulin or antiviral drug?   No   For women: Are you pregnant or is there a chance you could become       pregnant during the next month?   No   Have you received any vaccinations in the past 4 weeks?   No     Immunization questionnaire answers were all negative.      Per orders of Francisco Talley, injection of Pneumococcal 23 given by Jayme Gage. Patient instructed to remain in clinic for 15 minutes afterwards, and to report any adverse reaction to me immediately.       Questionnaire completed by patient.    Jayme Gage MA

## 2020-07-30 ENCOUNTER — VIRTUAL VISIT (OUTPATIENT)
Dept: NUTRITION | Facility: CLINIC | Age: 65
End: 2020-07-30
Payer: COMMERCIAL

## 2020-07-30 DIAGNOSIS — R73.03 PRE-DIABETES: Primary | ICD-10-CM

## 2020-07-30 PROCEDURE — 97803 MED NUTRITION INDIV SUBSEQ: CPT | Mod: 95

## 2020-07-30 NOTE — PROGRESS NOTES
"VIDEO:  Medical Nutrition Therapy  Visit Type:Initial assessment and intervention  Patient has given verbal consent for Video visit? Yes  Patient would like the video invitation sent by: ingacio      Type of service:  Video Visit  Originating Location (pt. Location): Home  Distant Location (provider location):  Home  Mode of Communication:  Video Conference via [employer]  Video Start Time: 1:30  Video End Time (time video stopped): 2:30  Patient would like to obtain AVS? Ignacio    Nikunj Keen presents today for MNT and education related to prediabetes.   He is accompanied by self.     ASSESSMENT:   Patient comments/concerns relating to nutrition: wants to avoid T2D, has family hx in mom, cousin.  Has done weight watchers in past    NUTRITION HISTORY:  He does the cooking  Using less salt, fat-free salad dressing, \"I can't believe it's not butter\"   Breakfast: 6-12 oz can tomato juice, bagel with cream cheese, might have eggs now working at home, hu a few days  Lunch: salad with black olives, garlic, fat free dressing or a sandwich with lunch meats  Dinner: stir whalen with tofu and veggies, homemade pizza- artichokes, about once a week frozen pizza eats half, cooks chicken breast, hamburger, every couple weeks sweet potato with splenda brown sugar, seldom has ice cream- but has been having every other day lately.  (says he has eaten a few things in the past week that has not had for years- salami, casserole)   Likes brussels cauliflower etc but not eating daily- lots of meals with no veggies or starch  ~Last night ribs (1/3 rack) mostly dry rub with light sauce,  tonight halibut with rice and anoop de gauthier on top  Snacks- \"binge after dinner\"- 2 servings of a chocolate bar  Beverages: Pop (Diet) Coke Zero /day,4-6 cups coffee/day (more in winter    Misses meals? no  Eats out:  Not assessed     Previous diet education:  No     Food allergies/intolerances: not reported    Diet is high in: fat (saturated) and " fat (unsaturated)  Diet is low in: fruits and vegetables    EXERCISE: was walking the dog every day this winter but now only doing twice/week, c/o foot pain, sedentary work on computer at home  ~ treadmill available    SOCIO/ECONOMIC:   Lives with: partner, Helene    MEDICATIONS:  Current Outpatient Medications   Medication     albuterol (PROAIR HFA/PROVENTIL HFA/VENTOLIN HFA) 108 (90 Base) MCG/ACT inhaler     atorvastatin (LIPITOR) 80 MG tablet     No current facility-administered medications for this visit.        LABS:  Last Basic Metabolic Panel:  Lab Results   Component Value Date     12/11/2019      Lab Results   Component Value Date    POTASSIUM 4.2 12/11/2019     Lab Results   Component Value Date    CHLORIDE 107 12/11/2019     Lab Results   Component Value Date    SHREE 8.8 12/11/2019     Lab Results   Component Value Date    CO2 26 12/11/2019     Lab Results   Component Value Date    BUN 15 12/11/2019     Lab Results   Component Value Date    CR 0.80 12/11/2019     Lab Results   Component Value Date     12/11/2019       ANTHROPOMETRICS:  Vitals: There were no vitals taken for this visit.  There is no height or weight on file to calculate BMI.      Wt Readings from Last 5 Encounters:   07/15/20 99.8 kg (220 lb)   05/28/20 93 kg (205 lb)   02/14/20 97.3 kg (214 lb 6.4 oz)   12/11/19 96.6 kg (213 lb)       Weight Change: he says he has gained, if he likes food he overeats    NUTRITION DIAGNOSIS: Excessive energy intake related to high-fat food choices and portion sizes as evidenced by diet recall, pre-diabetes/insulin resistance dx    NUTRITION INTERVENTION:  Nutrition Prescription: If carb counting- Carbohydrate Intake: 30-60 grams/meal and 15-30 grams/snacks  He is interested in exploring plant-based meal plan approach as alternative, discussion around low-fat vegan meal plan research for prevention of diabetes    Education given to support: general nutrition guidelines, fat modification, portion  "control and plant-based research    Education Materials Provided: My Plate Planner/Choose My Plate and Understanding Diabetes book along with Control[employer] site    PATIENT'S BEHAVIOR CHANGE GOALS:   See Patient Instructions for patient stated behavior change goals.   1) gradually increase activity with recommended 150 minutes/week goal, ok to break it up over the day  2) use carb counting for portion control, explore plant-based eating  3) talk to provider about possibility of using Metformin XR with A1C 6.4%, explained PCP may not want to start this yet, but worth checking  MONITOR / EVALUATE:  RD will monitor/evaluate:  Beliefs and attitudes related to food  Food and nutrition knowledge / skills  Food / Beverage / Nutrient intake   Progress toward meeting stated nutrition-related goals  Weight change    FOLLOW-UP:  Follow-up appointment scheduled on 8/27.     Lauro Hill,   So nice to visit with you yesterday!   I'm sorry for the delay in getting the email out to you, wanted to check the Control4 site once more, though I know you were taking look ??I'll attach an \"Understanding Diabetes\" booklet that has carb info starting on page 5, and also a few [employer] resources that I use for teaching.     Briefly, yesterday we reviewed strategies for delaying diabetes which include:  ~ healthy eating: either carb counting (2-4 carb choices per meal = 30-60 grams carb/meal) or considering a low-fat plant-based meal plan  ~ increasing activity, some it better than none! but 150 minutes per week is recommended  ~ you might talk to your provider about Metformin XR medication, starting even a small dose like 500-1000 mg/day may be beneficial in delaying the progression to diabetes, it is very provider dependent though as it would technically be off-label use    Teddy Calvin! Please let me know if you have any questions, otherwise we'll reconnect on the 27th ??  Lorraine Valenzuela RD, LD, CDE  Time spent in minutes: 60 " rebecca  Encounter: Individual

## 2020-08-12 ENCOUNTER — HOSPITAL ENCOUNTER (OUTPATIENT)
Dept: CT IMAGING | Facility: CLINIC | Age: 65
Discharge: HOME | End: 2020-08-12
Attending: PHYSICIAN ASSISTANT | Admitting: PHYSICIAN ASSISTANT
Payer: COMMERCIAL

## 2020-08-12 PROCEDURE — G0297 LDCT FOR LUNG CA SCREEN: HCPCS

## 2020-08-12 NOTE — RESULT ENCOUNTER NOTE
Lauro Hill,  Good news!  Your lung cancer screening results are normal.  You should continue to have this test every year.  Please let me know if you have any further questions or concerns.  Sincerely,  Dr. Jazmín Maldonado MD for [facility]    Thank you for choosing the [facility] as your health care provider. Please don't hesitate to call with any questions or concerns [phone].

## 2020-08-27 ENCOUNTER — ALLIED HEALTH/NURSE VISIT (OUTPATIENT)
Dept: NUTRITION | Facility: CLINIC | Age: 65
End: 2020-08-27
Payer: COMMERCIAL

## 2020-08-27 DIAGNOSIS — R73.03 PRE-DIABETES: Primary | ICD-10-CM

## 2020-08-27 PROCEDURE — G0108 DIAB MANAGE TRN  PER INDIV: HCPCS | Mod: 95

## 2020-08-27 NOTE — PROGRESS NOTES
"PHONE Medical Nutrition Therapy  Visit Type:Reassessment and intervention   Patient verbally consented to the telephone visit service today: yes      Nikunj Keen presents today for MNT and education related to prediabetes.   He is accompanied by self on phone    ASSESSMENT:   Patient comments/concerns relating to nutrition: he has tried a vegan meal plan using the [employer] \"21- day kickstart\".  He gives great feedback from his experience- he ended up buying many special ingredients, but the 21 days did not call for using leftovers so food was wasting. In week 2 he stopped following the recipes and ate the leftovers.   This is the end of the 3rd week. He has explored [place] [employer] recipes, too, and found some he likes.   ~ He says positives are that he eats a lot of food, is never hungry, is not skipping meals is learning new cooking techniques and recipes which he is enjoying. He does say he feels better, has lost 14#. Says there are 8-10 recipes they would make again- it's a little bean heavy so he's done some  options for a change.   Sergio reports \"I like this, I love the way I'm eating\" - good family support.  He recognizes that he can eat as much as he wants (true per the research).  \"My goal is not to find substitutes for meat or use fake meats (analogs) or fake cheese\". Would rather use different whole foods that aren't trying to be something else. Made tofu scramble, tofu rancheros and liked it.   Has gotten a B12 vit 2500 mcg weekly  Wonders about getting D, K, calcium, omega 3: reviewed plant-based sources and that he is eating many of these foods already. Recommend Vit D supplement during winter months when not outside.   ~ has oat, almond, and soy milk to use as creamer, eats a variety of leafy greens, consumes soy, gabe, hemp seed    NUTRITION HISTORY:    Breakfast: overnight oats or tofu scramble  Lunch: & Dinner: large variety of bean/rice options, Asian options, some salads, sandwiches with " bean spread, etc.   Snacks: tomato juice, sprouted bread with a smear of jam    Misses meals? no     Previous diet education:  Yes at initial ed     Diet is high in: fiber, fruits and vegetables  Diet is low in: fat (saturated), fat (unsaturated), sodium as intended    EXERCISE:was walking the dog every day this winter but now only doing twice/week, c/o foot pain, sedentary work on computer at home  ~ treadmill available    SOCIO/ECONOMIC:   Lives with: partner    MEDICATIONS:  Current Outpatient Medications   Medication     albuterol (PROAIR HFA/PROVENTIL HFA/VENTOLIN HFA) 108 (90 Base) MCG/ACT inhaler     atorvastatin (LIPITOR) 80 MG tablet     No current facility-administered medications for this visit.        LABS:  Last Basic Metabolic Panel:  Lab Results   Component Value Date     12/11/2019      Lab Results   Component Value Date    POTASSIUM 4.2 12/11/2019     Lab Results   Component Value Date    CHLORIDE 107 12/11/2019     Lab Results   Component Value Date    SHREE 8.8 12/11/2019     Lab Results   Component Value Date    CO2 26 12/11/2019     Lab Results   Component Value Date    BUN 15 12/11/2019     Lab Results   Component Value Date    CR 0.80 12/11/2019     Lab Results   Component Value Date     12/11/2019       ANTHROPOMETRICS:  Vitals: There were no vitals taken for this visit.  There is no height or weight on file to calculate BMI.      Wt Readings from Last 5 Encounters:   07/15/20 99.8 kg (220 lb)   05/28/20 93 kg (205 lb)   02/14/20 97.3 kg (214 lb 6.4 oz)   12/11/19 96.6 kg (213 lb)       Weight Change: 14# loss    NUTRITION DIAGNOSIS: No nutrition diagnosis at this time related to he is eating a low-fat vegan diet for diabetes prevention or delay as evidenced by discussion, food recall    NUTRITION INTERVENTION:  Continue current meal plan  Discussed checking BG if he is interested and gave cutoffs: 70-99 WNL, 100-125 pre-diabetes, 126+ diabetes: insurance will not cover, but if  interested he can buy    PATIENT'S BEHAVIOR CHANGE GOALS:   Continue low fat vegan meal plan  Follow up with PCP in October, anticipate A1C check, cholesterol check    MONITOR / EVALUATE:  RD will monitor/evaluate:  Food / Beverage / Nutrient intake   Pertinent Labs  Weight change    FOLLOW-UP:  Follow up with RD as needed.    Genny Valenzuela RD, LD, CDE    Time spent in minutes: 55  Encounter: Individual

## 2020-09-09 ENCOUNTER — OFFICE VISIT (OUTPATIENT)
Dept: DERMATOLOGY | Facility: CLINIC | Age: 65
End: 2020-09-09
Attending: PHYSICIAN ASSISTANT
Payer: COMMERCIAL

## 2020-09-09 DIAGNOSIS — L82.1 SEBORRHEIC KERATOSIS: Primary | ICD-10-CM

## 2020-09-09 ASSESSMENT — PAIN SCALES - GENERAL: PAINLEVEL: NO PAIN (0)

## 2020-09-09 NOTE — PATIENT INSTRUCTIONS
Benign thickenings of skin = Seborrheic keratosis   If they ever annoy or bother you, we can freeze with liquid nitrogen    The ABCDEs of Melanoma  Asymmetry, Border (irregularity), Color (not uniform, changes in color), Diameter (greater than 6 mm which is about the size of a pencil eraser), and Evolving (any changes in preexisting moles)    Skin cancer can develop anywhere on the skin. Ask someone for help when checking your skin, especially in hard to see places. If you notice a mole different from others, or that changes, enlarges, itches, or bleeds (even if it is small), you should see a dermatologist.      Return as needed.

## 2020-09-09 NOTE — LETTER
"9/9/2020       RE: Nikunj Keen  [address] [facility] [address]     Dear Colleague,    Thank you for referring your patient, Nikunj Keen, to the [facility]. Please see a copy of my visit note below.    [facility] Dermatology Note      Dermatology Problem List:  1. SKs.    CC:   Chief Complaint   Patient presents with     Derm Problem     Sergio is here today for a spot on his cheek and right forearm. He states \" the spot on my forearm has been there for many years and the spot on my cheek x4 years\"         Encounter Date: Sep 9, 2020    History of Present Illness:  Mr. Nikunj Keen is a 64 year old male who presents for evaluation of lesion on cheek and R arm.  Lesion on cheek has been there for 4-5 years. It sometimes gets irritated from shaving. It is not growing or symptomatic. Also notes spot on his right arm that is \"firm\". It has been there for years. Not growing, changing, or symptomatic.     No other painful, bleeding, non-healing, or otherwise symptomatic lesions.   No personal or family history of skin cancer.   Otherwise feeling well, no additional skin concerns.     Past Medical History:   Patient Active Problem List   Diagnosis     BAKARI (obstructive sleep apnea)     No past medical history on file.  No past surgical history on file.    Social History:  Patient reports that he has been smoking. He has never used smokeless tobacco. He reports previous alcohol use. He reports that he does not use drugs.    Family History:  No family history on file.    Medications:  Current Outpatient Medications   Medication Sig Dispense Refill     albuterol (PROAIR HFA/PROVENTIL HFA/VENTOLIN HFA) 108 (90 Base) MCG/ACT inhaler Inhale 2 puffs into the lungs every 6 hours 1 Inhaler 4     atorvastatin (LIPITOR) 80 MG tablet Take 1 tablet (80 mg) by mouth daily 90 tablet 0     No Known Allergies      Review of " Systems:  -Constitutional: Otherwise feeling well today, in usual state of health.  -Skin: As above in HPI. No additional skin concerns.    Physical exam:  GEN: This is a well developed, well-nourished male in no acute distress, in a pleasant mood.    SKIN: Focused examination of the right cheek and forearms/hands was performed.  -Walton skin type: II  -Filiform stuck on papule on right cheek with some erythema at base, wearing mask.  -Stuck on papule right forearm.  -Declined remainder of exam including rest of face.  -No other lesions of concern on areas examined.     Impression/Plan:  1. Seborrheic keratoses. Discussed the natural history and benign nature of this lesion. Reassurance provided that no additional treatment is necessary. One on the right cheek appears inflamed today but as it is not too bothersome he does not want treatment. History of stability also reassuring. Advised him to let me know if it changes or he would like to pursue treatment. Signs of skin cancer discussed and photoprotection advised. He was appreciative of visit.    CC Francisco Talley PA-C  [address] [place] [address] on close of this encounter.    Follow-up prn.       Staff Involved:  Staff Only    Danielle Foster MD    Department of Dermatology  [facility]: Phone: [phone], Fax: [phone]  9/9/2020      Danielle Foster MD

## 2020-09-09 NOTE — NURSING NOTE
"Chief Complaint   Patient presents with     Derm Problem     Sergio is here today for a spot on his cheek and right forearm. He states \" the spot on my forearm has been there for many years and the spot on my cheek x4 years\"     TONIO William  "

## 2020-09-09 NOTE — PROGRESS NOTES
"[facility] Dermatology Note      Dermatology Problem List:  1. SKs.    CC:   Chief Complaint   Patient presents with     Derm Problem     Sergio is here today for a spot on his cheek and right forearm. He states \" the spot on my forearm has been there for many years and the spot on my cheek x4 years\"         Encounter Date: Sep 9, 2020    History of Present Illness:  Mr. Nikunj Keen is a 64 year old male who presents for evaluation of lesion on cheek and R arm.  Lesion on cheek has been there for 4-5 years. It sometimes gets irritated from shaving. It is not growing or symptomatic. Also notes spot on his right arm that is \"firm\". It has been there for years. Not growing, changing, or symptomatic.     No other painful, bleeding, non-healing, or otherwise symptomatic lesions.   No personal or family history of skin cancer.   Otherwise feeling well, no additional skin concerns.     Past Medical History:   Patient Active Problem List   Diagnosis     BAKARI (obstructive sleep apnea)     No past medical history on file.  No past surgical history on file.    Social History:  Patient reports that he has been smoking. He has never used smokeless tobacco. He reports previous alcohol use. He reports that he does not use drugs.    Family History:  No family history on file.    Medications:  Current Outpatient Medications   Medication Sig Dispense Refill     albuterol (PROAIR HFA/PROVENTIL HFA/VENTOLIN HFA) 108 (90 Base) MCG/ACT inhaler Inhale 2 puffs into the lungs every 6 hours 1 Inhaler 4     atorvastatin (LIPITOR) 80 MG tablet Take 1 tablet (80 mg) by mouth daily 90 tablet 0     No Known Allergies      Review of Systems:  -Constitutional: Otherwise feeling well today, in usual state of health.  -Skin: As above in HPI. No additional skin concerns.    Physical exam:  GEN: This is a well developed, well-nourished male in no acute distress, in a pleasant mood.    SKIN: Focused examination of the right cheek " and forearms/hands was performed.  -Walton skin type: II  -Filiform stuck on papule on right cheek with some erythema at base, wearing mask.  -Stuck on papule right forearm.  -Declined remainder of exam including rest of face.  -No other lesions of concern on areas examined.     Impression/Plan:  1. Seborrheic keratoses. Discussed the natural history and benign nature of this lesion. Reassurance provided that no additional treatment is necessary. One on the right cheek appears inflamed today but as it is not too bothersome he does not want treatment. History of stability also reassuring. Advised him to let me know if it changes or he would like to pursue treatment. Signs of skin cancer discussed and photoprotection advised. He was appreciative of visit.    CC Francisco Talley PA-C  [address] [place] [address] on close of this encounter.    Follow-up prn.       Staff Involved:  Staff Only    Danielle Foster MD    Department of Dermatology  [facility]: Phone: [phone], Fax: [phone]  9/9/2020

## 2020-10-05 DIAGNOSIS — R73.03 PREDIABETES: ICD-10-CM

## 2020-10-05 DIAGNOSIS — E78.5 HYPERLIPIDEMIA LDL GOAL <130: ICD-10-CM

## 2020-10-05 LAB
CHOLEST SERPL-MCNC: 129 MG/DL
HBA1C MFR BLD: 5.8 % (ref 0–5.6)
HDLC SERPL-MCNC: 32 MG/DL
LDLC SERPL CALC-MCNC: 61 MG/DL
NONHDLC SERPL-MCNC: 97 MG/DL
TRIGL SERPL-MCNC: 178 MG/DL

## 2020-10-05 PROCEDURE — 80061 LIPID PANEL: CPT | Performed by: PHYSICIAN ASSISTANT

## 2020-10-05 PROCEDURE — 36415 COLL VENOUS BLD VENIPUNCTURE: CPT | Performed by: PHYSICIAN ASSISTANT

## 2020-10-05 PROCEDURE — 83036 HEMOGLOBIN GLYCOSYLATED A1C: CPT | Performed by: PHYSICIAN ASSISTANT

## 2020-10-15 ENCOUNTER — TELEPHONE (OUTPATIENT)
Dept: GASTROENTEROLOGY | Facility: CLINIC | Age: 65
End: 2020-10-15

## 2020-10-15 NOTE — TELEPHONE ENCOUNTER
Left message for patient to call back to schedule colonoscopy.    Procedure: Lower Endoscopy    Lower Endoscopy Type: Colonoscopy    Purpose of Colonoscopy Procedure: Screening    Colonoscopy Sedation: Conscious/Moderate      Dx: Screen for colon cancer [Z12.11]

## 2020-11-13 ENCOUNTER — TELEPHONE (OUTPATIENT)
Dept: FAMILY MEDICINE | Facility: CLINIC | Age: 65
End: 2020-11-13

## 2020-11-13 NOTE — TELEPHONE ENCOUNTER
Marifer Rosenbaum routed conversation to [facility] 10 hours ago (6:53 AM)      Sergio Keen  Patient Appointment Schedule Request Pool Yesterday (2:56 PM)        Appointment Request From: Nikunj Keen     With Provider: Francisco Talley PA-C [[facility]]     Preferred Date Range: 11/16/2020 - 11/17/2020     Preferred Times: Monday Morning, Tuesday Morning     Reason for visit: Request an Appointment     Comments:  For the past 4 weeks I have had a sore throat, earache and draining sinus.  I have taken over the counter allergy medicine and lozenges.  It hurts to swallow. Looking inside my mouth I can see a swelling or growth back of the right side of my throat and on my jaw under my chin i  can feel some swelling on the right side. No fever. No loss of taste or smell. Joints do not hurt.  I have not been around anyone who has come down with Covid. Strep or perhaps something else?

## 2020-11-13 NOTE — TELEPHONE ENCOUNTER
Pt contacted by phone.   He was given virtual visit this Monday with Naa Willoughby, DIANE, BSN

## 2020-11-16 ENCOUNTER — VIRTUAL VISIT (OUTPATIENT)
Dept: FAMILY MEDICINE | Facility: CLINIC | Age: 65
End: 2020-11-16
Payer: COMMERCIAL

## 2020-11-16 DIAGNOSIS — J01.90 ACUTE SINUSITIS WITH SYMPTOMS > 10 DAYS: Primary | ICD-10-CM

## 2020-11-16 PROCEDURE — 99213 OFFICE O/P EST LOW 20 MIN: CPT | Mod: 95 | Performed by: PHYSICIAN ASSISTANT

## 2020-11-16 RX ORDER — FLUTICASONE PROPIONATE 50 MCG
2 SPRAY, SUSPENSION (ML) NASAL DAILY
Qty: 18 G | Refills: 1 | Status: SHIPPED | OUTPATIENT
Start: 2020-11-16 | End: 2020-12-17

## 2020-11-16 NOTE — PROGRESS NOTES
"Nikunj Keen is a 64 year old male who is being evaluated via a billable video visit.      The patient has been notified of following:     \"This video visit will be conducted via a call between you and your physician/provider. We have found that certain health care needs can be provided without the need for an in-person physical exam.  This service lets us provide the care you need with a video conversation.  If a prescription is necessary we can send it directly to your pharmacy.  If lab work is needed we can place an order for that and you can then stop by our lab to have the test done at a later time.    Video visits are billed at different rates depending on your insurance coverage.  Please reach out to your insurance provider with any questions.    If during the course of the call the physician/provider feels a video visit is not appropriate, you will not be charged for this service.\"     Patient has given verbal consent for Video visit? Yes  How would you like to obtain your AVS? MyChart  If you are dropped from the video visit, the video invite should be resent to: Text to cell phone: [phone]  Will anyone else be joining your video visit? No  Subjective     Nikunj Keen is a 64 year old male who presents today via video visit for the following health issues:    HPI     Acute Illness  Acute illness concerns: throat   Onset/Duration: 4 weeks   Symptoms:  Fever: no  Chills/Sweats: no  Headache (location?): no  Sinus Pressure: YES and PND  Conjunctivitis:  no  Ear Pain: YES: right  Rhinorrhea: YES  Congestion: no  Sore Throat: YES  Cough: YES  Wheeze: no  Decreased Appetite: YES  Nausea: no  Vomiting: no  Diarrhea: no  Dysuria/Freq.: no  Dysuria or Hematuria: no  Fatigue/Achiness: no  Sick/Strep Exposure: no  Therapies tried and outcome: Tylenol   No known sick contacts.  Partner checked for COVID regularly.       Video Start Time: 10:20 AM      Review of Systems   Constitutional, HEENT, " "cardiovascular, pulmonary, GI, , musculoskeletal, neuro, skin, endocrine and psych systems are negative, except as otherwise noted.      Objective           Vitals:  No vitals were obtained today due to virtual visit.    Physical Exam     GENERAL: Healthy, alert and no distress  EYES: Eyes grossly normal to inspection.  No discharge or erythema, or obvious scleral/conjunctival abnormalities.  RESP: No audible wheeze, cough, or visible cyanosis.  No visible retractions or increased work of breathing.    SKIN: Visible skin clear. No significant rash, abnormal pigmentation or lesions.  NEURO: Cranial nerves grossly intact.  Mentation and speech appropriate for age.  PSYCH: Mentation appears normal, affect normal/bright, judgement and insight intact, normal speech and appearance well-groomed.      No results found for this or any previous visit (from the past 24 hour(s)).        Assessment & Plan     Acute sinusitis with symptoms > 10 days  Presumptively treat with oral antibiotic at this time; consider strep screening vs further work up if no improvement; see patient instructions as well.  - amoxicillin-clavulanate (AUGMENTIN) 875-125 MG tablet; Take 1 tablet by mouth 2 times daily for 10 days  - fluticasone (FLONASE) 50 MCG/ACT nasal spray; Spray 2 sprays into both nostrils daily     Tobacco Cessation:   reports that he has been smoking. He has never used smokeless tobacco.  Tobacco Cessation Action Plan: Information offered: Patient not interested at this time      BMI:   Estimated body mass index is 35.51 kg/m  as calculated from the following:    Height as of 5/28/20: 1.676 m (5' 6\").    Weight as of 7/15/20: 99.8 kg (220 lb).   Weight management plan: Discussed healthy diet and exercise guidelines       Patient Instructions   Encouraged mucinex/guafenisin, warm salt water gargles, cepacol spray, soothers/lozenges, sinus rinses (neilmed), flonase (2 sprays per nostril daily x 2 weeks), vitamin c, fluids and " rest.  May alternate tylenol and NSAIDS (ibuprofen, advil, aleve type products) every 4-6 hours for the next few days as needed.    Prescription for augmentin (antibiotic) sent to pharmacy.  Return to clinic with any worsening or changes in symptoms.        Return in about 6 months (around 5/16/2021) for Routine Visit, or sooner with worsening symptoms.    Blanca Berry PA-C  [facility]      Video-Visit Details    Type of service:  Video Visit    Video End Time:10:28 AM    Originating Location (pt. Location): Home    Distant Location (provider location):  [facility]     Platform used for Video Visit: Kyra

## 2020-11-30 ENCOUNTER — MYC REFILL (OUTPATIENT)
Dept: FAMILY MEDICINE | Facility: CLINIC | Age: 65
End: 2020-11-30

## 2020-11-30 DIAGNOSIS — J01.90 ACUTE SINUSITIS WITH SYMPTOMS > 10 DAYS: ICD-10-CM

## 2020-12-01 NOTE — TELEPHONE ENCOUNTER
Routing refill request to provider for review/approval because:  Drug not on the [facility] refill protocol       Last Written Prescription Date:  11/16/2020  Last Fill Quantity: 20,  # refills: 0   Last office visit: 7/15/2020 with prescribing provider:  Virtual visit with LANNY Berry on 11/16/2020   Future Office Visit:   Next 5 appointments (look out 90 days)    Dec 17, 2020  9:20 AM  PHYSICAL with Francisco Talley PA-C  [facility] ([facility]) [address] [place] [address] [phone]

## 2020-12-14 ASSESSMENT — ENCOUNTER SYMPTOMS
HEMATOCHEZIA: 0
NAUSEA: 0
DIZZINESS: 0
FREQUENCY: 0
SORE THROAT: 0
HEADACHES: 0
HEMATURIA: 0
CHILLS: 0
COUGH: 0
SHORTNESS OF BREATH: 0
DYSURIA: 0
NERVOUS/ANXIOUS: 0
EYE PAIN: 0
JOINT SWELLING: 0
CONSTIPATION: 0
DIARRHEA: 0
ARTHRALGIAS: 0
MYALGIAS: 0
FEVER: 0
PALPITATIONS: 0
HEARTBURN: 0
ABDOMINAL PAIN: 0
PARESTHESIAS: 0
WEAKNESS: 0

## 2020-12-17 ENCOUNTER — OFFICE VISIT (OUTPATIENT)
Dept: FAMILY MEDICINE | Facility: CLINIC | Age: 65
End: 2020-12-17
Payer: COMMERCIAL

## 2020-12-17 VITALS
HEART RATE: 73 BPM | RESPIRATION RATE: 16 BRPM | HEIGHT: 65 IN | TEMPERATURE: 97 F | OXYGEN SATURATION: 98 % | DIASTOLIC BLOOD PRESSURE: 68 MMHG | SYSTOLIC BLOOD PRESSURE: 118 MMHG | BODY MASS INDEX: 30.35 KG/M2 | WEIGHT: 182.2 LBS

## 2020-12-17 DIAGNOSIS — K14.8 TONGUE LESION: ICD-10-CM

## 2020-12-17 DIAGNOSIS — Z00.00 ROUTINE GENERAL MEDICAL EXAMINATION AT A HEALTH CARE FACILITY: Primary | ICD-10-CM

## 2020-12-17 DIAGNOSIS — E78.5 HYPERLIPIDEMIA LDL GOAL <130: ICD-10-CM

## 2020-12-17 DIAGNOSIS — J01.90 ACUTE SINUSITIS WITH SYMPTOMS > 10 DAYS: ICD-10-CM

## 2020-12-17 DIAGNOSIS — J02.9 SORE THROAT: ICD-10-CM

## 2020-12-17 LAB
ALBUMIN SERPL-MCNC: 3.4 G/DL (ref 3.4–5)
ALP SERPL-CCNC: 133 U/L (ref 40–150)
ALT SERPL W P-5'-P-CCNC: 21 U/L (ref 0–70)
ANION GAP SERPL CALCULATED.3IONS-SCNC: 1 MMOL/L (ref 3–14)
AST SERPL W P-5'-P-CCNC: 13 U/L (ref 0–45)
BILIRUB SERPL-MCNC: 0.4 MG/DL (ref 0.2–1.3)
BUN SERPL-MCNC: 11 MG/DL (ref 7–30)
CALCIUM SERPL-MCNC: 8.9 MG/DL (ref 8.5–10.1)
CHLORIDE SERPL-SCNC: 107 MMOL/L (ref 94–109)
CHOLEST SERPL-MCNC: 132 MG/DL
CO2 SERPL-SCNC: 29 MMOL/L (ref 20–32)
CREAT SERPL-MCNC: 0.72 MG/DL (ref 0.66–1.25)
GFR SERPL CREATININE-BSD FRML MDRD: >90 ML/MIN/{1.73_M2}
GLUCOSE SERPL-MCNC: 92 MG/DL (ref 70–99)
HBA1C MFR BLD: 5.9 % (ref 0–5.6)
HDLC SERPL-MCNC: 31 MG/DL
LDLC SERPL CALC-MCNC: 70 MG/DL
NONHDLC SERPL-MCNC: 101 MG/DL
POTASSIUM SERPL-SCNC: 4.5 MMOL/L (ref 3.4–5.3)
PROT SERPL-MCNC: 7.4 G/DL (ref 6.8–8.8)
SODIUM SERPL-SCNC: 137 MMOL/L (ref 133–144)
TRIGL SERPL-MCNC: 155 MG/DL

## 2020-12-17 PROCEDURE — 80061 LIPID PANEL: CPT | Performed by: PHYSICIAN ASSISTANT

## 2020-12-17 PROCEDURE — 99213 OFFICE O/P EST LOW 20 MIN: CPT | Mod: 25 | Performed by: PHYSICIAN ASSISTANT

## 2020-12-17 PROCEDURE — 36415 COLL VENOUS BLD VENIPUNCTURE: CPT | Performed by: PHYSICIAN ASSISTANT

## 2020-12-17 PROCEDURE — 83036 HEMOGLOBIN GLYCOSYLATED A1C: CPT | Performed by: PHYSICIAN ASSISTANT

## 2020-12-17 PROCEDURE — 80053 COMPREHEN METABOLIC PANEL: CPT | Performed by: PHYSICIAN ASSISTANT

## 2020-12-17 PROCEDURE — 99396 PREV VISIT EST AGE 40-64: CPT | Performed by: PHYSICIAN ASSISTANT

## 2020-12-17 RX ORDER — FLUTICASONE PROPIONATE 50 MCG
2 SPRAY, SUSPENSION (ML) NASAL DAILY
Qty: 18 G | Refills: 4 | Status: SHIPPED | OUTPATIENT
Start: 2020-12-17 | End: 2022-01-26

## 2020-12-17 ASSESSMENT — ENCOUNTER SYMPTOMS
WEAKNESS: 0
ARTHRALGIAS: 0
HEMATURIA: 0
NERVOUS/ANXIOUS: 0
EYE PAIN: 0
HEARTBURN: 0
HEADACHES: 0
DYSURIA: 0
PARESTHESIAS: 0
HEMATOCHEZIA: 0
DIARRHEA: 0
FREQUENCY: 0
FEVER: 0
CHILLS: 0
JOINT SWELLING: 0
DIZZINESS: 0
NAUSEA: 0
MYALGIAS: 0
PALPITATIONS: 0
COUGH: 0
SORE THROAT: 0
ABDOMINAL PAIN: 0
SHORTNESS OF BREATH: 0
CONSTIPATION: 0

## 2020-12-17 ASSESSMENT — MIFFLIN-ST. JEOR: SCORE: 1548.33

## 2020-12-17 NOTE — PROGRESS NOTES
fSUBJECTIVE:   CC: Nikunj Keen is an 65 year old male who presents for preventative health visit.   Patient has been advised of split billing requirements and indicates understanding: Yes  Healthy Habits:     Getting at least 3 servings of Calcium per day:  Yes    Bi-annual eye exam:  Yes    Dental care twice a year:  Yes    Sleep apnea or symptoms of sleep apnea:  Sleep apnea    Diet:  Vegetarian/vegan    Frequency of exercise:  2-3 days/week    Duration of exercise:  15-30 minutes    Taking medications regularly:  Yes    Medication side effects:  Not applicable    PHQ-2 Total Score: 0    Additional concerns today:  Yes    Sergio is here for routine health physical  Things are going well overall  He reports significant weight loss through diet changes  In July 2020 he weighed 220 pounds.   Today he weights 182 pounds.   From physical standpoint he is feeling good.  Adopted a vegan diet. Enjoys the food he is making. Takes more time but he does not miss old diet.  Cut out all the processed foods.      Additional concern:  He brings up swelling in throat since November 2020.   He had a virtual visit for this and was empirically treated for Augmentin.  This seemed to help with post nasal drip and congestion. Once finished Rx his symptoms came back. He was prescribed additional course of Augmentin. Same thing happened.   Still has some sinus drainage. Needs refill of Flonase.  Notices a lump in right side of mouth. Wondering what it is.   He is a current every day smoker.   The lesion affects his voice sometimes. Wondering if it is an infection or something else.   His right ear also bothers him sometimes.     Today's PHQ-2 Score:   PHQ-2 ( 1999 Pfizer) 12/14/2020   Q1: Little interest or pleasure in doing things 0   Q2: Feeling down, depressed or hopeless 0   PHQ-2 Score 0   Q1: Little interest or pleasure in doing things Not at all   Q2: Feeling down, depressed or hopeless Not at all   PHQ-2 Score 0       Abuse:  "Current or Past(Physical, Sexual or Emotional)- No  Do you feel safe in your environment? Yes        Social History     Tobacco Use     Smoking status: Current Every Day Smoker     Smokeless tobacco: Never Used   Substance Use Topics     Alcohol use: Not Currently     If you drink alcohol do you typically have >3 drinks per day or >7 drinks per week? No    Alcohol Use 12/17/2020   Prescreen: >3 drinks/day or >7 drinks/week? -   Prescreen: >3 drinks/day or >7 drinks/week? No     Last PSA:   PSA   Date Value Ref Range Status   12/11/2019 0.92 0 - 4 ug/L Final     Comment:     Assay Method:  Chemiluminescence using Siemens Vista analyzer       Reviewed orders with patient. Reviewed health maintenance and updated orders accordingly - Yes    Reviewed and updated as needed this visit by clinical staff   Allergies               Reviewed and updated as needed this visit by Provider                  Review of Systems   Constitutional: Negative for chills and fever.   HENT: Positive for hearing loss. Negative for congestion, ear pain and sore throat.    Eyes: Negative for pain and visual disturbance.   Respiratory: Negative for cough and shortness of breath.    Cardiovascular: Negative for chest pain, palpitations and peripheral edema.   Gastrointestinal: Negative for abdominal pain, constipation, diarrhea, heartburn, hematochezia and nausea.   Genitourinary: Negative for discharge, dysuria, frequency, genital sores, hematuria, impotence and urgency.   Musculoskeletal: Negative for arthralgias, joint swelling and myalgias.   Skin: Negative for rash.   Neurological: Negative for dizziness, weakness, headaches and paresthesias.   Psychiatric/Behavioral: Negative for mood changes. The patient is not nervous/anxious.        OBJECTIVE:   /68   Pulse 73   Temp 97  F (36.1  C) (Oral)   Resp 16   Ht 1.651 m (5' 5\")   Wt 82.6 kg (182 lb 3.2 oz)   SpO2 98%   BMI 30.32 kg/m      Physical Exam  Vitals signs and nursing note " reviewed.   Constitutional:       Appearance: Normal appearance.   HENT:      Head: Normocephalic and atraumatic.      Right Ear: Ear canal and external ear normal. There is no impacted cerumen. Tympanic membrane is retracted.      Left Ear: Tympanic membrane, ear canal and external ear normal. There is no impacted cerumen.      Mouth/Throat:     Eyes:      Conjunctiva/sclera: Conjunctivae normal.   Cardiovascular:      Rate and Rhythm: Normal rate and regular rhythm.      Heart sounds: Normal heart sounds.   Pulmonary:      Effort: Pulmonary effort is normal.      Breath sounds: Normal breath sounds.   Abdominal:      General: Bowel sounds are normal.      Palpations: Abdomen is soft.      Tenderness: There is no abdominal tenderness.   Musculoskeletal: Normal range of motion.   Skin:     General: Skin is warm and dry.   Neurological:      Mental Status: He is alert and oriented to person, place, and time.   Psychiatric:         Mood and Affect: Mood normal.         Behavior: Behavior normal.         Diagnostic Test Results:  Labs reviewed in Epic  Results for orders placed or performed in visit on 12/17/20 (from the past 24 hour(s))   Hemoglobin A1c   Result Value Ref Range    Hemoglobin A1C 5.9 (H) 0 - 5.6 %       ASSESSMENT/PLAN:   Nikunj was seen today for physical and pharyngitis.    Diagnoses and all orders for this visit:    Routine general medical examination at a health care facility  -     Comprehensive metabolic panel (BMP + Alb, Alk Phos, ALT, AST, Total. Bili, TP)  -     Fecal colorectal cancer screen (FIT); Future    Hyperlipidemia LDL goal <130  -     Comprehensive metabolic panel (BMP + Alb, Alk Phos, ALT, AST, Total. Bili, TP)  -     Lipid panel reflex to direct LDL Fasting  -     Hemoglobin A1c    Acute sinusitis with symptoms > 10 days  -     fluticasone (FLONASE) 50 MCG/ACT nasal spray; Spray 2 sprays into both nostrils daily    Sore throat    Tongue lesion  -     OTOLARYNGOLOGY  "REFERRAL      Tongue lesion is of concern given his smoking history -- not wanting to quit smoking right now  STAT referral to ENT for further evaluation and biopsy    Refill of Flonase to help with sinus symptoms    He has been making great progress with weight loss through diet changes. Could improve exercise. He has interest in lowering dose of statin if possible. Will recheck cholesterol today.       Patient has been advised of split billing requirements and indicates understanding: Yes  -- Sergio did mention during our visit that since I saw the lump on his tongue while examining him it should be covered as preventive. Of note, he did bring up all of these concerns with me during the history taking portion of our visit before the exam.      COUNSELING:   Reviewed preventive health counseling, as reflected in patient instructions       Regular exercise       Healthy diet/nutrition       Colon cancer screening    Estimated body mass index is 30.32 kg/m  as calculated from the following:    Height as of this encounter: 1.651 m (5' 5\").    Weight as of this encounter: 82.6 kg (182 lb 3.2 oz).     Weight management plan: Discussed healthy diet and exercise guidelines    He reports that he has been smoking. He has never used smokeless tobacco.  Tobacco Cessation Action Plan:   Information offered: Patient not interested at this time      Counseling Resources:  ATP IV Guidelines  Pooled Cohorts Equation Calculator  FRAX Risk Assessment  ICSI Preventive Guidelines  Dietary Guidelines for Americans, 2010  USDA's MyPlate  ASA Prophylaxis  Lung CA Screening    Francisco Talley PA-C  [facility]  "

## 2021-01-08 ENCOUNTER — TRANSFERRED RECORDS (OUTPATIENT)
Dept: HEALTH INFORMATION MANAGEMENT | Facility: CLINIC | Age: 66
End: 2021-01-08

## 2021-01-13 ENCOUNTER — MYC MEDICAL ADVICE (OUTPATIENT)
Dept: FAMILY MEDICINE | Facility: CLINIC | Age: 66
End: 2021-01-13

## 2021-01-13 NOTE — TELEPHONE ENCOUNTER
Francisco-Please review patient's message.      Thank you!  ALESIA JosephN, RN  [facility]

## 2021-01-13 NOTE — TELEPHONE ENCOUNTER
Thanks for the update.  Just got off the phone with Sergio to discuss everything.   He will continue to send occasional MyChart updates to us as he starts treatment.  Best,  Francisco Talley

## 2021-01-14 ENCOUNTER — TRANSFERRED RECORDS (OUTPATIENT)
Dept: HEALTH INFORMATION MANAGEMENT | Facility: CLINIC | Age: 66
End: 2021-01-14

## 2021-01-14 NOTE — TELEPHONE ENCOUNTER
Noted.    No further action needed from triage.  ALESIA JosephN, RN  [facility]

## 2021-01-15 ENCOUNTER — TRANSFERRED RECORDS (OUTPATIENT)
Dept: HEALTH INFORMATION MANAGEMENT | Facility: CLINIC | Age: 66
End: 2021-01-15

## 2021-01-20 ENCOUNTER — TRANSFERRED RECORDS (OUTPATIENT)
Dept: HEALTH INFORMATION MANAGEMENT | Facility: CLINIC | Age: 66
End: 2021-01-20

## 2021-01-22 ENCOUNTER — TRANSFERRED RECORDS (OUTPATIENT)
Dept: HEALTH INFORMATION MANAGEMENT | Facility: CLINIC | Age: 66
End: 2021-01-22

## 2021-01-23 ENCOUNTER — MYC MEDICAL ADVICE (OUTPATIENT)
Dept: FAMILY MEDICINE | Facility: CLINIC | Age: 66
End: 2021-01-23

## 2021-02-05 ENCOUNTER — TRANSFERRED RECORDS (OUTPATIENT)
Dept: HEALTH INFORMATION MANAGEMENT | Facility: CLINIC | Age: 66
End: 2021-02-05

## 2021-02-11 ENCOUNTER — TRANSFERRED RECORDS (OUTPATIENT)
Dept: HEALTH INFORMATION MANAGEMENT | Facility: CLINIC | Age: 66
End: 2021-02-11

## 2021-02-18 ENCOUNTER — TRANSFERRED RECORDS (OUTPATIENT)
Dept: HEALTH INFORMATION MANAGEMENT | Facility: CLINIC | Age: 66
End: 2021-02-18

## 2021-02-24 ENCOUNTER — TRANSFERRED RECORDS (OUTPATIENT)
Dept: HEALTH INFORMATION MANAGEMENT | Facility: CLINIC | Age: 66
End: 2021-02-24

## 2021-02-25 ENCOUNTER — TRANSFERRED RECORDS (OUTPATIENT)
Dept: HEALTH INFORMATION MANAGEMENT | Facility: CLINIC | Age: 66
End: 2021-02-25

## 2021-03-11 ENCOUNTER — TRANSFERRED RECORDS (OUTPATIENT)
Dept: HEALTH INFORMATION MANAGEMENT | Facility: CLINIC | Age: 66
End: 2021-03-11

## 2021-03-12 ENCOUNTER — TRANSFERRED RECORDS (OUTPATIENT)
Dept: HEALTH INFORMATION MANAGEMENT | Facility: CLINIC | Age: 66
End: 2021-03-12

## 2021-03-17 ENCOUNTER — TRANSFERRED RECORDS (OUTPATIENT)
Dept: HEALTH INFORMATION MANAGEMENT | Facility: CLINIC | Age: 66
End: 2021-03-17

## 2021-03-18 ENCOUNTER — IMMUNIZATION (OUTPATIENT)
Dept: NURSING | Facility: CLINIC | Age: 66
End: 2021-03-18
Payer: COMMERCIAL

## 2021-03-18 PROCEDURE — 0011A PR COVID VAC MODERNA 100 MCG/0.5 ML IM: CPT

## 2021-03-18 PROCEDURE — 91301 PR COVID VAC MODERNA 100 MCG/0.5 ML IM: CPT

## 2021-03-31 ENCOUNTER — TRANSFERRED RECORDS (OUTPATIENT)
Dept: HEALTH INFORMATION MANAGEMENT | Facility: CLINIC | Age: 66
End: 2021-03-31

## 2021-04-13 ENCOUNTER — TRANSFERRED RECORDS (OUTPATIENT)
Dept: HEALTH INFORMATION MANAGEMENT | Facility: CLINIC | Age: 66
End: 2021-04-13

## 2021-04-15 ENCOUNTER — IMMUNIZATION (OUTPATIENT)
Dept: NURSING | Facility: CLINIC | Age: 66
End: 2021-04-15
Payer: COMMERCIAL

## 2021-04-15 PROCEDURE — 0012A PR COVID VAC MODERNA 100 MCG/0.5 ML IM: CPT

## 2021-04-15 PROCEDURE — 91301 PR COVID VAC MODERNA 100 MCG/0.5 ML IM: CPT

## 2021-05-06 ENCOUNTER — TRANSFERRED RECORDS (OUTPATIENT)
Dept: HEALTH INFORMATION MANAGEMENT | Facility: CLINIC | Age: 66
End: 2021-05-06

## 2021-05-10 ENCOUNTER — TRANSFERRED RECORDS (OUTPATIENT)
Dept: HEALTH INFORMATION MANAGEMENT | Facility: CLINIC | Age: 66
End: 2021-05-10

## 2021-06-03 ENCOUNTER — TRANSFERRED RECORDS (OUTPATIENT)
Dept: HEALTH INFORMATION MANAGEMENT | Facility: CLINIC | Age: 66
End: 2021-06-03

## 2021-06-21 ENCOUNTER — TRANSFERRED RECORDS (OUTPATIENT)
Dept: HEALTH INFORMATION MANAGEMENT | Facility: CLINIC | Age: 66
End: 2021-06-21

## 2021-06-24 ENCOUNTER — TRANSFERRED RECORDS (OUTPATIENT)
Dept: HEALTH INFORMATION MANAGEMENT | Facility: CLINIC | Age: 66
End: 2021-06-24

## 2021-07-01 ENCOUNTER — TRANSFERRED RECORDS (OUTPATIENT)
Dept: HEALTH INFORMATION MANAGEMENT | Facility: CLINIC | Age: 66
End: 2021-07-01

## 2021-07-29 ENCOUNTER — TRANSFERRED RECORDS (OUTPATIENT)
Dept: HEALTH INFORMATION MANAGEMENT | Facility: CLINIC | Age: 66
End: 2021-07-29

## 2021-08-05 ENCOUNTER — TRANSFERRED RECORDS (OUTPATIENT)
Dept: HEALTH INFORMATION MANAGEMENT | Facility: CLINIC | Age: 66
End: 2021-08-05

## 2021-08-19 ENCOUNTER — MYC MEDICAL ADVICE (OUTPATIENT)
Dept: FAMILY MEDICINE | Facility: CLINIC | Age: 66
End: 2021-08-19

## 2021-08-19 DIAGNOSIS — Z12.11 SPECIAL SCREENING FOR MALIGNANT NEOPLASMS, COLON: Primary | ICD-10-CM

## 2021-08-19 DIAGNOSIS — Z23 NEED FOR SHINGLES VACCINE: ICD-10-CM

## 2021-08-23 NOTE — TELEPHONE ENCOUNTER
Please sign off on orders for cologuard test and shingles vaccine. should her contact his oncologist for the covid booster?   thank you

## 2021-08-23 NOTE — TELEPHONE ENCOUNTER
Francisco,    1. He can be advised to get shingles vaccine at our pharmacy  2. Already has had PCP changed to Dr Guerra  3. Do you want to put in cologuard test order?    Emma Willoughby, RN, BSN  [facility]

## 2021-08-25 DIAGNOSIS — Z12.11 SPECIAL SCREENING FOR MALIGNANT NEOPLASMS, COLON: ICD-10-CM

## 2021-08-25 DIAGNOSIS — R19.5 POSITIVE COLORECTAL CANCER SCREENING USING COLOGUARD TEST: Primary | ICD-10-CM

## 2021-08-26 ENCOUNTER — TRANSFERRED RECORDS (OUTPATIENT)
Dept: HEALTH INFORMATION MANAGEMENT | Facility: CLINIC | Age: 66
End: 2021-08-26

## 2021-08-27 NOTE — PROGRESS NOTES
This encounter was created solely for the purpose of releasing the future order that was placed for Cologuard.  This is a necessary step in order for the results to be abstracted once they are available.  Austin Nava

## 2021-09-11 ENCOUNTER — HEALTH MAINTENANCE LETTER (OUTPATIENT)
Age: 66
End: 2021-09-11

## 2021-09-15 LAB — COLOGUARD-ABSTRACT: POSITIVE

## 2021-09-22 ENCOUNTER — TRANSFERRED RECORDS (OUTPATIENT)
Dept: HEALTH INFORMATION MANAGEMENT | Facility: CLINIC | Age: 66
End: 2021-09-22

## 2021-09-23 ENCOUNTER — TRANSFERRED RECORDS (OUTPATIENT)
Dept: HEALTH INFORMATION MANAGEMENT | Facility: CLINIC | Age: 66
End: 2021-09-23

## 2021-10-28 ENCOUNTER — TRANSFERRED RECORDS (OUTPATIENT)
Dept: HEALTH INFORMATION MANAGEMENT | Facility: CLINIC | Age: 66
End: 2021-10-28
Payer: COMMERCIAL

## 2021-11-19 ENCOUNTER — TRANSFERRED RECORDS (OUTPATIENT)
Dept: HEALTH INFORMATION MANAGEMENT | Facility: CLINIC | Age: 66
End: 2021-11-19
Payer: COMMERCIAL

## 2021-11-26 ENCOUNTER — TRANSFERRED RECORDS (OUTPATIENT)
Dept: HEALTH INFORMATION MANAGEMENT | Facility: CLINIC | Age: 66
End: 2021-11-26
Payer: COMMERCIAL

## 2022-01-24 ASSESSMENT — ENCOUNTER SYMPTOMS
FEVER: 0
EYE PAIN: 0
ABDOMINAL PAIN: 0
SORE THROAT: 0
HEADACHES: 0
PARESTHESIAS: 0
NERVOUS/ANXIOUS: 0
HEMATURIA: 0
ARTHRALGIAS: 0
DIZZINESS: 0
SHORTNESS OF BREATH: 0
JOINT SWELLING: 0
MYALGIAS: 0
CONSTIPATION: 0
DIARRHEA: 0
HEARTBURN: 1
FREQUENCY: 0
COUGH: 0
NAUSEA: 0
HEMATOCHEZIA: 0
WEAKNESS: 0
CHILLS: 0

## 2022-01-26 ENCOUNTER — OFFICE VISIT (OUTPATIENT)
Dept: FAMILY MEDICINE | Facility: CLINIC | Age: 67
End: 2022-01-26
Payer: COMMERCIAL

## 2022-01-26 VITALS
DIASTOLIC BLOOD PRESSURE: 62 MMHG | TEMPERATURE: 97.2 F | HEART RATE: 74 BPM | RESPIRATION RATE: 16 BRPM | OXYGEN SATURATION: 95 % | SYSTOLIC BLOOD PRESSURE: 110 MMHG | BODY MASS INDEX: 25.83 KG/M2 | WEIGHT: 155 LBS | HEIGHT: 65 IN

## 2022-01-26 DIAGNOSIS — Z00.00 ROUTINE GENERAL MEDICAL EXAMINATION AT A HEALTH CARE FACILITY: Primary | ICD-10-CM

## 2022-01-26 DIAGNOSIS — R53.0 NEOPLASTIC (MALIGNANT) RELATED FATIGUE: ICD-10-CM

## 2022-01-26 DIAGNOSIS — C02.8: ICD-10-CM

## 2022-01-26 DIAGNOSIS — I89.0 LYMPHEDEMA OF FACE: ICD-10-CM

## 2022-01-26 PROCEDURE — 99396 PREV VISIT EST AGE 40-64: CPT | Performed by: PHYSICIAN ASSISTANT

## 2022-01-26 ASSESSMENT — ENCOUNTER SYMPTOMS
NAUSEA: 0
WEAKNESS: 0
DIARRHEA: 0
HEMATOCHEZIA: 0
SORE THROAT: 0
COUGH: 0
NERVOUS/ANXIOUS: 0
EYE PAIN: 0
PARESTHESIAS: 0
MYALGIAS: 0
ABDOMINAL PAIN: 0
HEMATURIA: 0
DIZZINESS: 0
CONSTIPATION: 0
HEADACHES: 0
ARTHRALGIAS: 0
SHORTNESS OF BREATH: 0
FEVER: 0
CHILLS: 0
FREQUENCY: 0
HEARTBURN: 1
JOINT SWELLING: 0

## 2022-01-26 ASSESSMENT — MIFFLIN-ST. JEOR: SCORE: 1419.96

## 2022-01-26 NOTE — PROGRESS NOTES
"SUBJECTIVE:   CC: Nikunj Keen is an 66 year old male who presents for preventative health visit.   Healthy Habits:     Getting at least 3 servings of Calcium per day:  NO    Bi-annual eye exam:  Yes    Dental care twice a year:  NO    Sleep apnea or symptoms of sleep apnea:  Sleep apnea    Diet:  Other    Frequency of exercise:  4-5 days/week    Duration of exercise:  15-30 minutes    Taking medications regularly:  Yes    Medication side effects:  None    PHQ-2 Total Score: 0    Additional concerns today:  No    Malignant neoplasm of tongue - identified at last annual physical 12/17/2020  Since has gone through treatment  Will continue to have PET scan and CT scan of neck quarterly  Has port in place   Not much of an appetite   Foods have taste different -- even water tastes differently  Some dysphagia  Read a book called \"Taste\" author had same cancer as Sergio   Feeling encouraged that after 2 years taste came back for the author   Still with fatigue, goes to bed 6-8 PM every night  Feels like could take a nap most of the time despite good sleep   Not sure if snoring - has CPAP   FitBit watch is telling him he gets good sleep   Medical cannabis is helping with this   More irritable thinks a lot to do with with food challenges   Going to try ensure   Lymphedema to neck was given a wrap  Jokes that he has a wrap to wear around his neck and then his CPAP and also a wedge   Seems ridiculous but he is able to sleep well     [facility]  Colonoscopy in January 2022, advised repeat in 10 years         Today's PHQ-2 Score:   PHQ-2 ( 1999 Pfizer) 1/24/2022   Q1: Little interest or pleasure in doing things 0   Q2: Feeling down, depressed or hopeless 0   PHQ-2 Score 0   PHQ-2 Total Score (12-17 Years)- Positive if 3 or more points; Administer PHQ-A if positive -   Q1: Little interest or pleasure in doing things Not at all   Q2: Feeling down, depressed or hopeless Not at all   PHQ-2 Score 0 "       Abuse: Current or Past(Physical, Sexual or Emotional)- No  Do you feel safe in your environment? Yes        Social History     Tobacco Use     Smoking status: Former Smoker     Packs/day: 1.00     Years: 45.00     Pack years: 45.00     Types: Cigarettes     Quit date: 1/15/2021     Years since quittin.0     Smokeless tobacco: Never Used     Tobacco comment: don't miss it - no cravings or urges to smoke.   Substance Use Topics     Alcohol use: Never     If you drink alcohol do you typically have >3 drinks per day or >7 drinks per week? No    Alcohol Use 2022   Prescreen: >3 drinks/day or >7 drinks/week? -   Prescreen: >3 drinks/day or >7 drinks/week? No   No flowsheet data found.    Last PSA:   PSA   Date Value Ref Range Status   2019 0.92 0 - 4 ug/L Final     Comment:     Assay Method:  Chemiluminescence using Siemens Vista analyzer       Reviewed orders with patient. Reviewed health maintenance and updated orders accordingly - Yes    Reviewed and updated as needed this visit by clinical staff  Tobacco  Allergies  Meds   Med Hx  Surg Hx  Fam Hx  Soc Hx       Reviewed and updated as needed this visit by Provider                 Review of Systems   Constitutional: Negative for chills and fever.   HENT: Positive for hearing loss. Negative for congestion, ear pain and sore throat.    Eyes: Negative for pain and visual disturbance.   Respiratory: Negative for cough and shortness of breath.    Cardiovascular: Negative for chest pain and peripheral edema.   Gastrointestinal: Positive for heartburn. Negative for abdominal pain, constipation, diarrhea, hematochezia and nausea.   Genitourinary: Negative for frequency, genital sores, hematuria, impotence, penile discharge and urgency.   Musculoskeletal: Negative for arthralgias, joint swelling and myalgias.   Skin: Negative for rash.   Neurological: Negative for dizziness, weakness, headaches and paresthesias.   Psychiatric/Behavioral: Negative for  "mood changes. The patient is not nervous/anxious.        OBJECTIVE:   /62 (BP Location: Left arm, Patient Position: Sitting, Cuff Size: Adult Regular)   Pulse 74   Temp 97.2  F (36.2  C) (Temporal)   Resp 16   Ht 1.651 m (5' 5\")   Wt 70.3 kg (155 lb)   SpO2 95%   BMI 25.79 kg/m      Physical Exam  GENERAL: healthy, alert and no distress  EYES: Eyes grossly normal to inspection, PERRL and conjunctivae and sclerae normal  HENT: ear canals and TM's normal, nose and mouth without ulcers or lesions  NECK: no adenopathy, no asymmetry, masses, or scars and thyroid normal to palpation  RESP: lungs clear to auscultation - no rales, rhonchi or wheezes  CV: regular rate and rhythm, normal S1 S2, no S3 or S4, no murmur, click or rub, no peripheral edema and peripheral pulses strong  ABDOMEN: soft, nontender, no hepatosplenomegaly, no masses and bowel sounds normal  MS: no gross musculoskeletal defects noted, no edema  SKIN: no suspicious lesions or rashes  NEURO: Normal strength and tone, mentation intact and speech normal  PSYCH: mentation appears normal, affect normal/bright    Diagnostic Test Results:  Labs reviewed in Epic  No results found for any visits on 01/26/22.    ASSESSMENT/PLAN:   Nikunj was seen today for physical.    Diagnoses and all orders for this visit:    Routine general medical examination at a health care facility  -     REVIEW OF HEALTH MAINTENANCE PROTOCOL ORDERS    Primary malignant neoplasm of overlapping sites of tongue (H)    Neoplastic (malignant) related fatigue    Lymphedema of face      [facility]  Anticipates [facility] will likely [facility]  Will continue to follow with onc as planned    COUNSELING:   Reviewed preventive health counseling, as reflected in patient instructions       Regular exercise       Healthy diet/nutrition       Colorectal cancer screening       Prostate cancer screening    Estimated body mass index is 25.79 kg/m  " "as calculated from the following:    Height as of this encounter: 1.651 m (5' 5\").    Weight as of this encounter: 70.3 kg (155 lb).     Weight management plan: Discussed healthy diet and exercise guidelines    He reports that he quit smoking about 12 months ago. His smoking use included cigarettes. He has a 45.00 pack-year smoking history. He has never used smokeless tobacco.  Tobacco Cessation Action Plan:   Sergio is no longer smoking      Counseling Resources:  ATP IV Guidelines  Pooled Cohorts Equation Calculator  FRAX Risk Assessment  ICSI Preventive Guidelines  Dietary Guidelines for Americans, 2010  USDA's MyPlate  ASA Prophylaxis  Lung CA Screening    Francisco Talley PA-C  [facility]  "

## 2022-10-29 ENCOUNTER — HEALTH MAINTENANCE LETTER (OUTPATIENT)
Age: 67
End: 2022-10-29

## 2023-04-08 ENCOUNTER — HEALTH MAINTENANCE LETTER (OUTPATIENT)
Age: 68
End: 2023-04-08

## 2024-06-09 ENCOUNTER — HEALTH MAINTENANCE LETTER (OUTPATIENT)
Age: 69
End: 2024-06-09